# Patient Record
Sex: MALE | Race: WHITE | Employment: FULL TIME | URBAN - METROPOLITAN AREA
[De-identification: names, ages, dates, MRNs, and addresses within clinical notes are randomized per-mention and may not be internally consistent; named-entity substitution may affect disease eponyms.]

---

## 2023-10-24 ENCOUNTER — OFFICE VISIT (OUTPATIENT)
Dept: PRIMARY CARE CLINIC | Age: 23
End: 2023-10-24
Payer: COMMERCIAL

## 2023-10-24 VITALS
DIASTOLIC BLOOD PRESSURE: 69 MMHG | BODY MASS INDEX: 21.74 KG/M2 | TEMPERATURE: 99.2 F | WEIGHT: 164 LBS | HEART RATE: 84 BPM | HEIGHT: 73 IN | OXYGEN SATURATION: 97 % | SYSTOLIC BLOOD PRESSURE: 109 MMHG

## 2023-10-24 DIAGNOSIS — Q89.8 STICKLER SYNDROME: ICD-10-CM

## 2023-10-24 DIAGNOSIS — Z00.00 ROUTINE PHYSICAL EXAMINATION: Primary | ICD-10-CM

## 2023-10-24 DIAGNOSIS — Z83.2 FAMILY HISTORY OF FACTOR V LEIDEN MUTATION: ICD-10-CM

## 2023-10-24 PROCEDURE — G8484 FLU IMMUNIZE NO ADMIN: HCPCS | Performed by: FAMILY MEDICINE

## 2023-10-24 PROCEDURE — 99385 PREV VISIT NEW AGE 18-39: CPT | Performed by: FAMILY MEDICINE

## 2023-10-24 ASSESSMENT — PATIENT HEALTH QUESTIONNAIRE - PHQ9
SUM OF ALL RESPONSES TO PHQ QUESTIONS 1-9: 0
1. LITTLE INTEREST OR PLEASURE IN DOING THINGS: 0
SUM OF ALL RESPONSES TO PHQ QUESTIONS 1-9: 0
SUM OF ALL RESPONSES TO PHQ9 QUESTIONS 1 & 2: 0
2. FEELING DOWN, DEPRESSED OR HOPELESS: 0
SUM OF ALL RESPONSES TO PHQ QUESTIONS 1-9: 0
SUM OF ALL RESPONSES TO PHQ QUESTIONS 1-9: 0

## 2023-10-24 NOTE — PROGRESS NOTES
[address] [facility]. PRIMARY CARE  [address] \[facility]\"" [address]  Dept: [phone]  Dept Fax: [phone]     10/24/2023      Karson Fragoso   2000     Chief Complaint   Patient presents with    Establish Care       HPI    Pt comes in today for new patient visit to establish care and annual physical. No acute concerns. Has not had check up in many years. He is working on smoking cessation. Motivated to quit. Would like for STI panel. Works in [email or web address]. 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 Family h/o Factor V Leiden. Would like screening. No data recorded       Prior to Visit Medications    Not on File        Past Medical History:   Diagnosis Date    Stickler syndrome         Social History     Tobacco Use    Smoking status: Every Day     Types: Cigarettes    Smokeless tobacco: Never   Substance Use Topics    Alcohol use: Yes     Alcohol/week: 16.0 standard drinks of alcohol     Types: 16 Cans of beer per week    Drug use: Not Currently        Past Surgical History:   Procedure Laterality Date    CLEFT PALATE REPAIR      + hemangioma removal    DENTAL SURGERY      MANDIBLE SURGERY      MYRINGOTOMY AND TYMPANOSTOMY TUBE PLACEMENT      OTHER SURGICAL HISTORY      Left eyebrow laceration repair    RETINAL DETACHMENT SURGERY Left         Allergies   Allergen Reactions    Amoxicillin Rash        Family History   Problem Relation Age of Onset    No Known Problems Mother     Other Father         Factor V Leiden    Cancer Paternal Grandmother         Patient's past medical history, surgical history, family history, medications, and allergies  were all reviewed and updated as appropriate today. Review of Systems   Constitutional:  Negative for chills, fever and unexpected weight change. Respiratory:  Negative for cough and shortness of breath. Cardiovascular:  Negative for chest pain, palpitations and leg swelling.

## 2023-10-24 NOTE — PATIENT INSTRUCTIONS
[facility] Laboratory Locations - No appointment necessary. ? indicates the location is open Saturdays in addition to Monday through Friday. Call your preferred location for test preparation, business hours and other information you need. [employer] accepts [employer]. [facility]    ? [place] [id number] [facility]. [facility], [address]    Ph: [address] [place] [facility], [address]    Ph: [phone]   ? [address]    Thea Bence, [address] [facility]    Ph: [address], [address] [facility]    Ph: [phone] ? [place]   [address]   Ph: [phone]  ? [address] [facility], [address] [facility]    Ph: [place] [address] [facility], [address] [facility]   Ph: [phone]    NORTH    ? [place], [place] [address]    Ph: [phone]  [facility]   [address] [facility], [facility]   Ph: Akhil Carney, [address] [facility]: [phone], [facility]5 [facility]    Ph: [address] [facility].  [facility] [place], [place] [address]    Ph: [phone]

## 2023-10-31 PROBLEM — Z83.2 FAMILY HISTORY OF FACTOR V LEIDEN MUTATION: Status: ACTIVE | Noted: 2023-10-31

## 2023-10-31 PROBLEM — Q89.8 STICKLER SYNDROME: Status: ACTIVE | Noted: 2023-10-31

## 2023-10-31 ASSESSMENT — ENCOUNTER SYMPTOMS
NAUSEA: 0
SHORTNESS OF BREATH: 0
DIARRHEA: 0
ABDOMINAL PAIN: 0
COUGH: 0
VOMITING: 0

## 2024-04-10 ENCOUNTER — OFFICE VISIT (OUTPATIENT)
Dept: PRIMARY CARE CLINIC | Age: 24
End: 2024-04-10
Payer: COMMERCIAL

## 2024-04-10 ENCOUNTER — TELEPHONE (OUTPATIENT)
Dept: PRIMARY CARE CLINIC | Age: 24
End: 2024-04-10

## 2024-04-10 ENCOUNTER — HOSPITAL ENCOUNTER (OUTPATIENT)
Age: 24
End: 2024-04-10
Payer: COMMERCIAL

## 2024-04-10 ENCOUNTER — HOSPITAL ENCOUNTER (OUTPATIENT)
Dept: GENERAL RADIOLOGY | Age: 24
Discharge: HOME | End: 2024-04-10
Payer: COMMERCIAL

## 2024-04-10 VITALS
WEIGHT: 163.4 LBS | HEART RATE: 63 BPM | OXYGEN SATURATION: 97 % | BODY MASS INDEX: 21.54 KG/M2 | DIASTOLIC BLOOD PRESSURE: 75 MMHG | SYSTOLIC BLOOD PRESSURE: 115 MMHG

## 2024-04-10 DIAGNOSIS — S93.402A SPRAIN OF LEFT ANKLE, UNSPECIFIED LIGAMENT, INITIAL ENCOUNTER: Primary | ICD-10-CM

## 2024-04-10 DIAGNOSIS — S93.402A SPRAIN OF LEFT ANKLE, UNSPECIFIED LIGAMENT, INITIAL ENCOUNTER: ICD-10-CM

## 2024-04-10 PROCEDURE — 1036F TOBACCO NON-USER: CPT | Performed by: FAMILY MEDICINE

## 2024-04-10 PROCEDURE — 73610 X-RAY EXAM OF ANKLE: CPT

## 2024-04-10 PROCEDURE — G8428 CUR MEDS NOT DOCUMENT: HCPCS | Performed by: FAMILY MEDICINE

## 2024-04-10 PROCEDURE — G8420 CALC BMI NORM PARAMETERS: HCPCS | Performed by: FAMILY MEDICINE

## 2024-04-10 PROCEDURE — 99213 OFFICE O/P EST LOW 20 MIN: CPT | Performed by: FAMILY MEDICINE

## 2024-04-10 SDOH — ECONOMIC STABILITY: INCOME INSECURITY: HOW HARD IS IT FOR YOU TO PAY FOR THE VERY BASICS LIKE FOOD, HOUSING, MEDICAL CARE, AND HEATING?: NOT VERY HARD

## 2024-04-10 SDOH — ECONOMIC STABILITY: TRANSPORTATION INSECURITY
IN THE PAST 12 MONTHS, HAS LACK OF TRANSPORTATION KEPT YOU FROM MEETINGS, WORK, OR FROM GETTING THINGS NEEDED FOR DAILY LIVING?: NO

## 2024-04-10 SDOH — ECONOMIC STABILITY: FOOD INSECURITY: WITHIN THE PAST 12 MONTHS, THE FOOD YOU BOUGHT JUST DIDN'T LAST AND YOU DIDN'T HAVE MONEY TO GET MORE.: NEVER TRUE

## 2024-04-10 SDOH — ECONOMIC STABILITY: FOOD INSECURITY: WITHIN THE PAST 12 MONTHS, YOU WORRIED THAT YOUR FOOD WOULD RUN OUT BEFORE YOU GOT MONEY TO BUY MORE.: NEVER TRUE

## 2024-04-10 SDOH — ECONOMIC STABILITY: HOUSING INSECURITY
IN THE LAST 12 MONTHS, WAS THERE A TIME WHEN YOU DID NOT HAVE A STEADY PLACE TO SLEEP OR SLEPT IN A SHELTER (INCLUDING NOW)?: NO

## 2024-04-10 ASSESSMENT — PATIENT HEALTH QUESTIONNAIRE - PHQ9
SUM OF ALL RESPONSES TO PHQ9 QUESTIONS 1 & 2: 2
1. LITTLE INTEREST OR PLEASURE IN DOING THINGS: SEVERAL DAYS
SUM OF ALL RESPONSES TO PHQ QUESTIONS 1-9: 2
SUM OF ALL RESPONSES TO PHQ QUESTIONS 1-9: 2
2. FEELING DOWN, DEPRESSED OR HOPELESS: SEVERAL DAYS
SUM OF ALL RESPONSES TO PHQ9 QUESTIONS 1 & 2: 2
1. LITTLE INTEREST OR PLEASURE IN DOING THINGS: SEVERAL DAYS
SUM OF ALL RESPONSES TO PHQ QUESTIONS 1-9: 2
SUM OF ALL RESPONSES TO PHQ QUESTIONS 1-9: 2
2. FEELING DOWN, DEPRESSED OR HOPELESS: SEVERAL DAYS

## 2024-04-10 NOTE — TELEPHONE ENCOUNTER
Can you call patient to let him know xray shows possible small fracture. I would like for him to see Ortho for follow up. I have placed a referral. In the meantime, continue with the brace, ice and limited weight bearing.     Referral Details       Referred By   Referred To    Nazia Toney DO   [address] [facility]   [address]   [facility] [address]   Phone: [phone]   Fax: [phone]    Diagnoses: Sprain of left ankle, unspecified ligament, initial encounter   Order: [facility] [place]   Reason: Specialty Services Required    [facility]   [address] [facility]   [address]   [facility] [address]   Phone: [phone]   Fax: [phone]

## 2024-04-11 ENCOUNTER — OFFICE VISIT (OUTPATIENT)
Dept: ORTHOPEDIC SURGERY | Age: 24
End: 2024-04-11
Payer: COMMERCIAL

## 2024-04-11 VITALS — BODY MASS INDEX: 21.17 KG/M2 | HEIGHT: 74 IN | WEIGHT: 165 LBS

## 2024-04-11 DIAGNOSIS — S93.421A SPRAIN OF DELTOID LIGAMENT OF RIGHT ANKLE, INITIAL ENCOUNTER: ICD-10-CM

## 2024-04-11 DIAGNOSIS — M25.572 LEFT ANKLE PAIN, UNSPECIFIED CHRONICITY: ICD-10-CM

## 2024-04-11 DIAGNOSIS — M79.672 LEFT FOOT PAIN: Primary | ICD-10-CM

## 2024-04-11 PROCEDURE — 99202 OFFICE O/P NEW SF 15 MIN: CPT | Performed by: PHYSICIAN ASSISTANT

## 2024-04-11 PROCEDURE — G8427 DOCREV CUR MEDS BY ELIG CLIN: HCPCS | Performed by: PHYSICIAN ASSISTANT

## 2024-04-11 PROCEDURE — G8420 CALC BMI NORM PARAMETERS: HCPCS | Performed by: PHYSICIAN ASSISTANT

## 2024-04-11 PROCEDURE — L4361 PNEUMA/VAC WALK BOOT PRE OTS: HCPCS | Performed by: PHYSICIAN ASSISTANT

## 2024-04-11 PROCEDURE — 1036F TOBACCO NON-USER: CPT | Performed by: PHYSICIAN ASSISTANT

## 2024-04-11 SDOH — HEALTH STABILITY: PHYSICAL HEALTH: ON AVERAGE, HOW MANY MINUTES DO YOU ENGAGE IN EXERCISE AT THIS LEVEL?: 150+ MIN

## 2024-04-11 SDOH — HEALTH STABILITY: PHYSICAL HEALTH: ON AVERAGE, HOW MANY DAYS PER WEEK DO YOU ENGAGE IN MODERATE TO STRENUOUS EXERCISE (LIKE A BRISK WALK)?: 4 DAYS

## 2024-04-11 NOTE — PROGRESS NOTES
edema noted.     Intact skin without lacerations or abrasions, no significant erythema, rashes or skin lesions.      X Rays: were performed in the office today:   AP, lateral and oblique x-ray of the left ankle as well as AP standing foot and oblique x-ray of the left foot.  No acute displaced fractures noted.  There is widening of the medial ankle mortise.  Suggestive of a significant deltoid ligament injury.    Additional Tests reviewed: none  Additional Outside Records reviewed: none    Diagnosis:       ICD-10-CM    1. Left foot pain  M79.672 XR FOOT LEFT (MIN 3 VIEWS)      2. Left ankle pain, unspecified chronicity  M25.572 XR ANKLE LEFT (MIN 3 VIEWS)     Breg / Airselect Tall Walking Boot      3. Sprain of deltoid ligament of right ankle, initial encounter  S93.421A            Assessment and Plan:       Assessment:  Acute left ankle sprain, bimalleolar like.  The medial ankle mortise is widened.       I had an extensive discussion with Mr. Drew Edmonds regarding the natural history, etiology, and long term consequences of his condition. I have presented reasonable alternatives to the patient's proposed care, treatment, and services.  Risks and benefits of the treatment options also reviewed in detail. I have outlined a treatment plan with them. He has had full opportunity to ask his questions.  I have answered them all to his satisfaction.  I feel that Mr. Drew Edmonds understands our discussion today.       Plan:  Medications-   Rest, Ice, Compression and Elevation  Activity restriction/ Modification discussed.   OTC NSAIDS discussed.   The most common side effects from NSAIDs are stomachaches, heartburn, and nausea. NSAIDs may irritate the stomach lining. If the medicine upsets your stomach, you can try taking it with food. But if that doesn't help, talk with your doctor to make sure it's not a more serious problem, such as a stomach ulcer or bleeding in the stomach or intestines.  Using NSAIDs

## 2024-04-11 NOTE — PROGRESS NOTES
[facility]  [facility] PRIMARY CARE  [address] [place] [address]  Dept: [phone]  Dept Fax: [phone]     4/10/2024      Drew Edmonds   2000     Chief Complaint   Patient presents with    Ankle Injury       HPI    Pt comes in today for left ankle injury. Notes he fell while rock climbing over the weekend. Was ~ 3 feet off the ground. Inversion roll of the ankle. Thought he felt or heard a crack. Immediately had pain/swelling and difficulty with weight bearing. Delayed getting it looked at because he was camping for the eclipse. Has been using crutches and a brace. Icing over the last 24 hours has helped. + swelling and bruising. Can tolerate some weight if he is using the crutches but hard to walk.     PHQ-9 Total Score: 2 (4/10/2024  9:43 AM)       Prior to Visit Medications    Not on File        Past Medical History:   Diagnosis Date    ADHD (attention deficit hyperactivity disorder) 8457-0825    Stickler syndrome         Social History     Tobacco Use    Smoking status: Former     Current packs/day: 0.00     Average packs/day: 0.5 packs/day for 6.8 years (3.4 ttl pk-yrs)     Types: Cigarettes, E-Cigarettes     Start date: 2017     Quit date: 2023     Years since quittin.4    Smokeless tobacco: Never   Substance Use Topics    Alcohol use: Yes     Alcohol/week: 16.0 standard drinks of alcohol     Types: 16 Cans of beer per week    Drug use: Not Currently        Past Surgical History:   Procedure Laterality Date    CLEFT PALATE REPAIR      + hemangioma removal    COSMETIC SURGERY      DENTAL SURGERY      EYE SURGERY  2009    MANDIBLE SURGERY      MYRINGOTOMY AND TYMPANOSTOMY TUBE PLACEMENT      OTHER SURGICAL HISTORY      Left eyebrow laceration repair    RETINAL DETACHMENT SURGERY Left         Allergies   Allergen Reactions    Amoxicillin Rash        Family History   Problem Relation Age of Onset    No Known Problems Mother     Other Father

## 2024-04-12 ENCOUNTER — OFFICE VISIT (OUTPATIENT)
Dept: ORTHOPEDIC SURGERY | Age: 24
End: 2024-04-12
Payer: COMMERCIAL

## 2024-04-12 VITALS — WEIGHT: 165 LBS | BODY MASS INDEX: 21.17 KG/M2 | HEIGHT: 74 IN

## 2024-04-12 DIAGNOSIS — S93.492A SPRAIN OF ANTERIOR TALOFIBULAR LIGAMENT OF LEFT ANKLE, INITIAL ENCOUNTER: Primary | ICD-10-CM

## 2024-04-12 PROCEDURE — G8420 CALC BMI NORM PARAMETERS: HCPCS | Performed by: ORTHOPAEDIC SURGERY

## 2024-04-12 PROCEDURE — 1036F TOBACCO NON-USER: CPT | Performed by: ORTHOPAEDIC SURGERY

## 2024-04-12 PROCEDURE — G8427 DOCREV CUR MEDS BY ELIG CLIN: HCPCS | Performed by: ORTHOPAEDIC SURGERY

## 2024-04-12 PROCEDURE — 99214 OFFICE O/P EST MOD 30 MIN: CPT | Performed by: ORTHOPAEDIC SURGERY

## 2024-04-12 RX ORDER — NAPROXEN 500 MG/1
500 TABLET ORAL 2 TIMES DAILY WITH MEALS
Qty: 60 TABLET | Refills: 0 | Status: SHIPPED | OUTPATIENT
Start: 2024-04-12 | End: 2024-05-12

## 2024-04-12 SDOH — HEALTH STABILITY: PHYSICAL HEALTH: ON AVERAGE, HOW MANY MINUTES DO YOU ENGAGE IN EXERCISE AT THIS LEVEL?: 150+ MIN

## 2024-04-12 SDOH — HEALTH STABILITY: PHYSICAL HEALTH: ON AVERAGE, HOW MANY DAYS PER WEEK DO YOU ENGAGE IN MODERATE TO STRENUOUS EXERCISE (LIKE A BRISK WALK)?: 4 DAYS

## 2024-04-12 NOTE — PROGRESS NOTES
CHIEF COMPLAINT: Left ankle pain/ Ankle ATF sprain.    DATE OF INJURY: 2024    HISTORY:  Mr. Edmonds 23 y.o.  male presents today for the first visit for evaluation of a left ankle injury which occurred when he was rock climbing and fell. He is complaining of lateral and medial ankle pain. He was seen By Deepti on 2024 because of the pain.  He reports today moderate pain, 6/10. Pain increase with walking and decrease with rest and elevation. No numbness or tingling sensation, and no other complaint.      Past Medical History:   Diagnosis Date    ADHD (attention deficit hyperactivity disorder) 0407-1269    Stickler syndrome         Past Surgical History:   Procedure Laterality Date    CLEFT PALATE REPAIR      + hemangioma removal    COSMETIC SURGERY      DENTAL SURGERY      EYE SURGERY      MANDIBLE SURGERY      MYRINGOTOMY AND TYMPANOSTOMY TUBE PLACEMENT      OTHER SURGICAL HISTORY      Left eyebrow laceration repair    RETINAL DETACHMENT SURGERY Left         Social History     Socioeconomic History    Marital status: Single     Spouse name: Not on file    Number of children: Not on file    Years of education: Not on file    Highest education level: Not on file   Occupational History    Not on file   Tobacco Use    Smoking status: Former     Current packs/day: 0.00     Average packs/day: 0.5 packs/day for 6.8 years (3.4 ttl pk-yrs)     Types: Cigarettes, E-Cigarettes     Start date: 2017     Quit date: 2023     Years since quittin.4    Smokeless tobacco: Never   Substance and Sexual Activity    Alcohol use: Yes     Alcohol/week: 16.0 standard drinks of alcohol     Types: 16 Cans of beer per week    Drug use: Not Currently    Sexual activity: Yes     Partners: Female   Other Topics Concern    Not on file   Social History Narrative    Not on file     Social Determinants of Health     Financial Resource Strain: Low Risk  (4/10/2024)    Overall Financial Resource Strain ([facility])

## 2024-04-24 ENCOUNTER — OFFICE VISIT (OUTPATIENT)
Dept: ORTHOPEDIC SURGERY | Age: 24
End: 2024-04-24
Payer: COMMERCIAL

## 2024-04-24 DIAGNOSIS — S93.492A SPRAIN OF ANTERIOR TALOFIBULAR LIGAMENT OF LEFT ANKLE, INITIAL ENCOUNTER: Primary | ICD-10-CM

## 2024-04-24 PROCEDURE — 99213 OFFICE O/P EST LOW 20 MIN: CPT | Performed by: ORTHOPAEDIC SURGERY

## 2024-04-24 PROCEDURE — G8428 CUR MEDS NOT DOCUMENT: HCPCS | Performed by: ORTHOPAEDIC SURGERY

## 2024-04-24 PROCEDURE — G8420 CALC BMI NORM PARAMETERS: HCPCS | Performed by: ORTHOPAEDIC SURGERY

## 2024-04-24 PROCEDURE — 1036F TOBACCO NON-USER: CPT | Performed by: ORTHOPAEDIC SURGERY

## 2024-04-24 NOTE — PROGRESS NOTES
CHIEF COMPLAINT: Left ankle pain/ Ankle ATF sprain.    DATE OF INJURY: 2024    HISTORY:  Mr. Edmonds 23 y.o.  male presents today for follow up visit for evaluation of a left ankle injury which occurred when he was rock climbing and fell. He is complaining of lateral and medial ankle pain that is improving. He was seen By Deepti on 2024 because of the pain.  He reports today mild pain, 4/10. Pain increase with walking and decrease with rest and elevation. He has been in a boot NWB. No numbness or tingling sensation, and no other complaint.      Past Medical History:   Diagnosis Date    ADHD (attention deficit hyperactivity disorder) 0333-3396    Stickler syndrome         Past Surgical History:   Procedure Laterality Date    CLEFT PALATE REPAIR      + hemangioma removal    COSMETIC SURGERY      DENTAL SURGERY      EYE SURGERY      MANDIBLE SURGERY      MYRINGOTOMY AND TYMPANOSTOMY TUBE PLACEMENT      OTHER SURGICAL HISTORY      Left eyebrow laceration repair    RETINAL DETACHMENT SURGERY Left         Social History     Socioeconomic History    Marital status: Single     Spouse name: Not on file    Number of children: Not on file    Years of education: Not on file    Highest education level: Not on file   Occupational History    Not on file   Tobacco Use    Smoking status: Former     Current packs/day: 0.00     Average packs/day: 0.5 packs/day for 6.8 years (3.4 ttl pk-yrs)     Types: Cigarettes, E-Cigarettes     Start date: 2017     Quit date: 2023     Years since quittin.4    Smokeless tobacco: Never   Substance and Sexual Activity    Alcohol use: Yes     Alcohol/week: 16.0 standard drinks of alcohol     Types: 16 Cans of beer per week    Drug use: Not Currently    Sexual activity: Yes     Partners: Female   Other Topics Concern    Not on file   Social History Narrative    Not on file     Social Determinants of Health     Financial Resource Strain: Low Risk  (4/10/2024)

## 2024-05-08 ENCOUNTER — OFFICE VISIT (OUTPATIENT)
Dept: ORTHOPEDIC SURGERY | Age: 24
End: 2024-05-08
Payer: COMMERCIAL

## 2024-05-08 VITALS — BODY MASS INDEX: 21.17 KG/M2 | WEIGHT: 165 LBS | HEIGHT: 74 IN

## 2024-05-08 DIAGNOSIS — S93.492A SPRAIN OF ANTERIOR TALOFIBULAR LIGAMENT OF LEFT ANKLE, INITIAL ENCOUNTER: Primary | ICD-10-CM

## 2024-05-08 DIAGNOSIS — S93.421A SPRAIN OF DELTOID LIGAMENT OF RIGHT ANKLE, INITIAL ENCOUNTER: ICD-10-CM

## 2024-05-08 PROCEDURE — G8427 DOCREV CUR MEDS BY ELIG CLIN: HCPCS | Performed by: ORTHOPAEDIC SURGERY

## 2024-05-08 PROCEDURE — G8420 CALC BMI NORM PARAMETERS: HCPCS | Performed by: ORTHOPAEDIC SURGERY

## 2024-05-08 PROCEDURE — 1036F TOBACCO NON-USER: CPT | Performed by: ORTHOPAEDIC SURGERY

## 2024-05-08 PROCEDURE — 99213 OFFICE O/P EST LOW 20 MIN: CPT | Performed by: ORTHOPAEDIC SURGERY

## 2024-05-08 NOTE — PROGRESS NOTES
CHIEF COMPLAINT: Left ankle pain/ Ankle ATF sprain.    DATE OF INJURY: 2024    HISTORY:  Mr. Edmonds 23 y.o.  male presents today for follow up visit for evaluation of a left ankle injury which occurred when he was rock climbing and fell. He has been WB in a boot. He reports improving lateral and medial ankle pain, still feels stiff. He was seen By Deepti on 2024 because of the pain.  He reports today mild pain, 0-1/10. Pain increase with walking and decrease with rest and elevation.  No numbness or tingling sensation, and no other complaint.  He is an avid rock climber.    Past Medical History:   Diagnosis Date    ADHD (attention deficit hyperactivity disorder) 7530-3524    Stickler syndrome         Past Surgical History:   Procedure Laterality Date    CLEFT PALATE REPAIR      + hemangioma removal    COSMETIC SURGERY      DENTAL SURGERY      EYE SURGERY      MANDIBLE SURGERY      MYRINGOTOMY AND TYMPANOSTOMY TUBE PLACEMENT      OTHER SURGICAL HISTORY      Left eyebrow laceration repair    RETINAL DETACHMENT SURGERY Left         Social History     Socioeconomic History    Marital status: Single     Spouse name: Not on file    Number of children: Not on file    Years of education: Not on file    Highest education level: Not on file   Occupational History    Not on file   Tobacco Use    Smoking status: Former     Current packs/day: 0.00     Average packs/day: 0.5 packs/day for 6.8 years (3.4 ttl pk-yrs)     Types: Cigarettes, E-Cigarettes     Start date: 2017     Quit date: 2023     Years since quittin.5    Smokeless tobacco: Never   Substance and Sexual Activity    Alcohol use: Yes     Alcohol/week: 16.0 standard drinks of alcohol     Types: 16 Cans of beer per week    Drug use: Not Currently    Sexual activity: Yes     Partners: Female   Other Topics Concern    Not on file   Social History Narrative    Not on file     Social Determinants of Health     Financial Resource Strain:

## 2024-05-10 ENCOUNTER — HOSPITAL ENCOUNTER (OUTPATIENT)
Dept: PHYSICAL THERAPY | Age: 24
Setting detail: THERAPIES SERIES
Discharge: HOME | End: 2024-05-10
Attending: ORTHOPAEDIC SURGERY
Payer: COMMERCIAL

## 2024-05-10 DIAGNOSIS — M25.572 ACUTE LEFT ANKLE PAIN: Primary | ICD-10-CM

## 2024-05-10 PROCEDURE — 97161 PT EVAL LOW COMPLEX 20 MIN: CPT

## 2024-05-10 PROCEDURE — 97110 THERAPEUTIC EXERCISES: CPT

## 2024-05-10 PROCEDURE — 97112 NEUROMUSCULAR REEDUCATION: CPT

## 2024-05-10 PROCEDURE — 97016 VASOPNEUMATIC DEVICE THERAPY: CPT

## 2024-05-10 NOTE — PLAN OF CARE
adjustment due to lack of progress  [] Patient is not progressing as expected and requires additional follow up with physician  [] Other:     TREATMENT PLAN     Frequency/Duration: 1-2x/week for 4-6 weeks for the following treatment interventions:    Interventions:  Therapeutic Exercise (08997) including: strength training, ROM, and functional mobility  Therapeutic Activities (06612) including: functional mobility training and education.  Neuromuscular Re-education (92235) activation and proprioception, including postural re-education.    Gait Training (52062) for normalization of ambulation patterns and AD training.   Manual Therapy (52957) as indicated to include: Passive Range of Motion, Soft Tissue Mobilization, and Dry Needling/IASTM  Modalities as needed that may include: Cryotherapy, Electrical Stimulation, Biofeedback, and Vasoneumatic Compression  Patient education on joint protection, postural re-education, activity modification, and progression of HEP    Plan: POC initiated as per evaluation    Electronically Signed by Radha Álvarez PT  Date: 05/10/2024     Note: Portions of this note have been templated and/or copied from initial evaluation, reassessments and prior notes for documentation efficiency.    Note: If patient does not return for scheduled/recommended follow up visits, this note will serve as a discharge from care along with the most recent update on progress.

## 2024-05-13 ENCOUNTER — HOSPITAL ENCOUNTER (OUTPATIENT)
Dept: PHYSICAL THERAPY | Age: 24
Setting detail: THERAPIES SERIES
Discharge: HOME | End: 2024-05-13
Attending: ORTHOPAEDIC SURGERY
Payer: COMMERCIAL

## 2024-05-13 PROCEDURE — 97016 VASOPNEUMATIC DEVICE THERAPY: CPT

## 2024-05-13 PROCEDURE — 97112 NEUROMUSCULAR REEDUCATION: CPT

## 2024-05-13 PROCEDURE — 97110 THERAPEUTIC EXERCISES: CPT

## 2024-05-13 NOTE — FLOWSHEET NOTE
[facility]- Outpatient Rehabilitation and Therapy [address] [place] [address] office: [phone] fax: [phone]           Physical Therapy: TREATMENT/PROGRESS NOTE   Patient: Drew Edmonds (23 y.o. male)   Examination Date: 2024   :  2000 MRN: [id number]   Visit #: 2   Insurance Allowable Auth Needed   [place] []Yes    []No    Insurance: Payor: UNITED HE[employer]CARE / Plan: UNITED HE[employer]CARE / Product Type: *No Product type* /   Insurance ID: [id number] - (Commercial)  Secondary Insurance (if applicable):    Treatment Diagnosis:     ICD-10-CM    1. Acute left ankle pain  M25.572          Medical Diagnosis:  Sprain of anterior talofibular ligament of left ankle, initial encounter [S93.492A]  Sprain of deltoid ligament of right ankle, initial encounter [S93.421A]   Referring Physician: Deepthi Moody MD  PCP: Nazia Toney DO     Plan of care signed (Y/N):     Date of Patient follow up with Physician:      Progress Report/POC: EVAL today  POC update due: (10 visits /OR AUTH LIMITS, whichever is less)  2024                                             Precautions/ Contra-indications:           Latex allergy:  NO  Pacemaker:    NO  Contraindications for Manipulation: None  Date of Surgery:   Other:    Red Flags:  None    C-SSRS Triggered by Intake questionnaire:   Patient answered 'NO' to both behavioral questions on intake.  No further screening warranted    Preferred Language for Healthcare:   [x] English       [] other:    SUBJECTIVE EXAMINATION     Today: pt notes that he got a lace up brace to wear and does feel like he can walk around longer with it on but he does feel some pressure pain along lateral ankle when he is wearing the brace and taller boots.          Test used Initial score  5/10/24 2024   Pain Summary VAS 6-7/10    Functional questionnaire LEFS 60%    Other:                  OBJECTIVE EXAMINATION     5/10/24  Joint mobility:

## 2024-05-17 ENCOUNTER — HOSPITAL ENCOUNTER (OUTPATIENT)
Dept: PHYSICAL THERAPY | Age: 24
Setting detail: THERAPIES SERIES
Discharge: HOME | End: 2024-05-17
Attending: ORTHOPAEDIC SURGERY
Payer: COMMERCIAL

## 2024-05-17 PROCEDURE — 97016 VASOPNEUMATIC DEVICE THERAPY: CPT

## 2024-05-17 PROCEDURE — 97110 THERAPEUTIC EXERCISES: CPT

## 2024-05-17 PROCEDURE — 97112 NEUROMUSCULAR REEDUCATION: CPT

## 2024-05-17 NOTE — FLOWSHEET NOTE
[facility]- Outpatient Rehabilitation and Therapy [address] [place] [address] office: [phone] fax: [phone]           Physical Therapy: TREATMENT/PROGRESS NOTE   Patient: Drew Edmonds (23 y.o. male)   Examination Date: 2024   :  2000 MRN: [id number]   Visit #: 3   Insurance Allowable Auth Needed   [place] []Yes    []No    Insurance: Payor: UNITED HEALTHCARE / Plan: UNITED HEALTHCARE / Product Type: *No Product type* /   Insurance ID: [id number] - (Commercial)  Secondary Insurance (if applicable):    Treatment Diagnosis:     ICD-10-CM    1. Acute left ankle pain  M25.572          Medical Diagnosis:  Sprain of anterior talofibular ligament of left ankle, initial encounter [S93.492A]  Sprain of deltoid ligament of right ankle, initial encounter [S93.421A]   Referring Physician: Deepthi Moody MD  PCP: Nazia Toney DO     Plan of care signed (Y/N):     Date of Patient follow up with Physician:      Progress Report/POC: NO  POC update due: (10 visits /OR AUTH LIMITS, whichever is less)  2024                                             Precautions/ Contra-indications:           Latex allergy:  NO  Pacemaker:    NO  Contraindications for Manipulation: None  Date of Surgery:   Other:    Red Flags:  None    C-SSRS Triggered by Intake questionnaire:   Patient answered 'NO' to both behavioral questions on intake.  No further screening warranted    Preferred Language for Healthcare:   [x] English       [] other:    SUBJECTIVE EXAMINATION     Today: pt notes he is feeling better with walking and standing after exercises. He did have some pain in ankle after trying some lower body lifting on Wednesday.          Test used Initial score  5/10/24 2024   Pain Summary VAS 6-7/10 2/10   Functional questionnaire LEFS 60%    Other:                  OBJECTIVE EXAMINATION     5/10/24  Joint mobility:    []Normal    [x]Hypo   []Hyper    Palpation:  TTP ATFL     Functional

## 2024-05-22 ENCOUNTER — HOSPITAL ENCOUNTER (OUTPATIENT)
Dept: PHYSICAL THERAPY | Age: 24
Setting detail: THERAPIES SERIES
Discharge: HOME | End: 2024-05-22
Attending: ORTHOPAEDIC SURGERY
Payer: COMMERCIAL

## 2024-05-22 PROCEDURE — 97016 VASOPNEUMATIC DEVICE THERAPY: CPT

## 2024-05-22 PROCEDURE — 97112 NEUROMUSCULAR REEDUCATION: CPT

## 2024-05-22 PROCEDURE — 97530 THERAPEUTIC ACTIVITIES: CPT

## 2024-05-22 PROCEDURE — 97110 THERAPEUTIC EXERCISES: CPT

## 2024-05-22 NOTE — FLOWSHEET NOTE
(Game Ready): Applied to Ankle Left for significant edema, swelling, pain control for 15 minutes.  Relevant ICD-10 R22.4 Localized swelling of lower limb.   Girth Left Right Post Vaso   Knee: Midpatella 54.5  54cm   Knee: 5 cm above      Knee: 15 cm above      Ankle: transmalleolar      Ankle: figure 8              Education/Home Exercise Program:   Access Code: [email or web address]  URL: [email or web address]  Date: 05/13/2024  Prepared by: Radha Álvarez    Exercises  - Long Sitting Calf Stretch with Strap  - 1 x daily - 7 x weekly - 1 sets - 3 reps - 30 hold  - Long Sitting Soleus Stretch on Bolster with Strap  - 1 x daily - 7 x weekly - 1 sets - 3 reps - 30 hold  - Seated Ankle Alphabet  - 1 x daily - 7 x weekly - 2 sets  - Long Sitting Ankle Plantar Flexion with Resistance  - 1 x daily - 7 x weekly - 3 sets - 10 reps  - Long Sitting Ankle Inversion with Resistance  - 1 x daily - 7 x weekly - 3 sets - 10 reps  - Long Sitting Ankle Eversion with Resistance  - 1 x daily - 7 x weekly - 3 sets - 10 reps  - Single Leg Stance with Support  - 1 x daily - 7 x weekly - 1 sets - 5 reps - 15-20 hold      ASSESSMENT   Assessment:   Drew Edmonds is a 23 y.o. male presenting today to Outpatient PT with signs and symptoms consistent with L ankle sprain .    Pt. presents with the functional impairments and activity limitations listed below and would benefit from Outpatient PT to address the below impairments as well as improve pain, and restore function.     Today's Assessment:  pt able to progress ck activities and balance activities without increase in pain. He does note that step up and overs are challenging to control motion on step down. Pt will continue to require skilled intervention in order to improve ck/wb tolerance, balance/proprioception to return to PLOF.      Medical Necessity Documentation:  I certify that this patient meets the below criteria necessary for medical necessity for care and/or justification of

## 2024-05-24 ENCOUNTER — HOSPITAL ENCOUNTER (OUTPATIENT)
Dept: PHYSICAL THERAPY | Age: 24
Setting detail: THERAPIES SERIES
Discharge: HOME | End: 2024-05-24
Attending: ORTHOPAEDIC SURGERY
Payer: COMMERCIAL

## 2024-05-24 PROCEDURE — 97112 NEUROMUSCULAR REEDUCATION: CPT

## 2024-05-24 PROCEDURE — 97530 THERAPEUTIC ACTIVITIES: CPT

## 2024-05-24 PROCEDURE — 97110 THERAPEUTIC EXERCISES: CPT

## 2024-05-24 NOTE — FLOWSHEET NOTE
Compression (Game Ready): Applied to Ankle Left for significant edema, swelling, pain control for 15 minutes.  Relevant ICD-10 R22.4 Localized swelling of lower limb.   Girth Left Right Post Vaso   Knee: Midpatella 54.5  54cm   Knee: 5 cm above      Knee: 15 cm above      Ankle: transmalleolar      Ankle: figure 8              Education/Home Exercise Program:   Access Code: [email or web address]  URL: [email or web address]  Date: 05/13/2024  Prepared by: Radha Álvarez    Exercises  - Long Sitting Calf Stretch with Strap  - 1 x daily - 7 x weekly - 1 sets - 3 reps - 30 hold  - Long Sitting Soleus Stretch on Bolster with Strap  - 1 x daily - 7 x weekly - 1 sets - 3 reps - 30 hold  - Seated Ankle Alphabet  - 1 x daily - 7 x weekly - 2 sets  - Long Sitting Ankle Plantar Flexion with Resistance  - 1 x daily - 7 x weekly - 3 sets - 10 reps  - Long Sitting Ankle Inversion with Resistance  - 1 x daily - 7 x weekly - 3 sets - 10 reps  - Long Sitting Ankle Eversion with Resistance  - 1 x daily - 7 x weekly - 3 sets - 10 reps  - Single Leg Stance with Support  - 1 x daily - 7 x weekly - 1 sets - 5 reps - 15-20 hold      ASSESSMENT   Assessment:   Drew Edmonds is a 23 y.o. male presenting today to Outpatient PT with signs and symptoms consistent with L ankle sprain .    Pt. presents with the functional impairments and activity limitations listed below and would benefit from Outpatient PT to address the below impairments as well as improve pain, and restore function.     Today's Assessment:  pt able to progress ck activities and balance activities. He notes that bosu steps and SL DL are challenging but do not cause pain.  Pt will continue to require skilled intervention in order to improve ck/wb tolerance, balance/proprioception to return to PLOF.      Medical Necessity Documentation:  I certify that this patient meets the below criteria necessary for medical necessity for care and/or justification of therapy services:  The

## 2024-05-28 ENCOUNTER — HOSPITAL ENCOUNTER (OUTPATIENT)
Dept: PHYSICAL THERAPY | Age: 24
Setting detail: THERAPIES SERIES
Discharge: HOME | End: 2024-05-28
Attending: ORTHOPAEDIC SURGERY
Payer: COMMERCIAL

## 2024-05-28 PROCEDURE — 97110 THERAPEUTIC EXERCISES: CPT

## 2024-05-28 PROCEDURE — 97530 THERAPEUTIC ACTIVITIES: CPT

## 2024-05-28 PROCEDURE — 97112 NEUROMUSCULAR REEDUCATION: CPT

## 2024-05-28 NOTE — FLOWSHEET NOTE
[facility]- Outpatient Rehabilitation and Therapy [address] [place] [address] office: [phone] fax: [phone]           Physical Therapy: TREATMENT/PROGRESS NOTE   Patient: Drew Edmonds (23 y.o. male)   Examination Date: 2024   :  2000 MRN: [id number]   Visit #: 6   Insurance Allowable Auth Needed   MN []Yes    []No    Insurance: Payor: [employer] / Plan: [employer] / Product Type: *No Product type* /   Insurance ID: [id number] - (Commercial)  Secondary Insurance (if applicable):    Treatment Diagnosis:     ICD-10-CM    1. Acute left ankle pain  M25.572          Medical Diagnosis:  Sprain of anterior talofibular ligament of left ankle, initial encounter [S93.492A]  Sprain of deltoid ligament of right ankle, initial encounter [S93.421A]   Referring Physician: Deepthi Moody MD  PCP: Nazia Toney DO     Plan of care signed (Y/N):     Date of Patient follow up with Physician:      Progress Report/POC: NO  POC update due: (10 visits /OR AUTH LIMITS, whichever is less)  2024                                             Precautions/ Contra-indications:           Latex allergy:  NO  Pacemaker:    NO  Contraindications for Manipulation: None  Date of Surgery:   Other:    Red Flags:  None    C-SSRS Triggered by Intake questionnaire:   Patient answered 'NO' to both behavioral questions on intake.  No further screening warranted    Preferred Language for Healthcare:   [x] English       [] other:    SUBJECTIVE EXAMINATION     Today:  pt notes he is doing alright, does still feel some popping and clicking in ankle at times but it is becoming less painful.          Test used Initial score  5/10/24 2024   Pain Summary VAS 6-7/10 1-2/10   Functional questionnaire LEFS 60%    Other:                  OBJECTIVE EXAMINATION     5/10/24  Joint mobility:    []Normal    [x]Hypo   []Hyper    Palpation:  TTP ATFL     Functional Mobility/Transfers:     Posture:

## 2024-05-30 ENCOUNTER — HOSPITAL ENCOUNTER (OUTPATIENT)
Dept: PHYSICAL THERAPY | Age: 24
Setting detail: THERAPIES SERIES
Discharge: HOME | End: 2024-05-30
Attending: ORTHOPAEDIC SURGERY
Payer: COMMERCIAL

## 2024-05-30 PROCEDURE — 97110 THERAPEUTIC EXERCISES: CPT

## 2024-05-30 PROCEDURE — 97112 NEUROMUSCULAR REEDUCATION: CPT

## 2024-05-30 PROCEDURE — 97530 THERAPEUTIC ACTIVITIES: CPT

## 2024-05-30 NOTE — FLOWSHEET NOTE
[facility]- Outpatient Rehabilitation and Therapy [address] [place] [address] office: [phone] fax: [phone]           Physical Therapy: TREATMENT/PROGRESS NOTE   Patient: Drew Edmonds (23 y.o. male)   Examination Date: 2024   :  2000 MRN: [id number]   Visit #: 7   Insurance Allowable Auth Needed   MN []Yes    []No    Insurance: Payor: [employer] / Plan: [employer] HE[employer] / Product Type: *No Product type* /   Insurance ID: [id number] - (Commercial)  Secondary Insurance (if applicable):    Treatment Diagnosis:     ICD-10-CM    1. Acute left ankle pain  M25.572          Medical Diagnosis:  Sprain of anterior talofibular ligament of left ankle, initial encounter [S93.492A]  Sprain of deltoid ligament of right ankle, initial encounter [S93.421A]   Referring Physician: Deepthi Moody MD  PCP: Nazia Toney DO     Plan of care signed (Y/N):     Date of Patient follow up with Physician:      Progress Report/POC: NO  POC update due: (10 visits /OR AUTH LIMITS, whichever is less)  2024                                             Precautions/ Contra-indications:           Latex allergy:  NO  Pacemaker:    NO  Contraindications for Manipulation: None  Date of Surgery:   Other:    Red Flags:  None    C-SSRS Triggered by Intake questionnaire:   Patient answered 'NO' to both behavioral questions on intake.  No further screening warranted    Preferred Language for Healthcare:   [x] English       [] other:    SUBJECTIVE EXAMINATION     Today:  pt notes he did a lower body workout Tuesday and felt some soreness in ankle afterwards but otherwise was pleased he was able to do so.           Test used Initial score  5/10/24 2024   Pain Summary VAS 6-7/10 1-210   Functional questionnaire LEFS 60%    Other:                  OBJECTIVE EXAMINATION     5/10/24  Joint mobility:    []Normal    [x]Hypo   []Hyper    Palpation:  TTP ATFL     Functional Mobility/Transfers:

## 2024-06-05 ENCOUNTER — HOSPITAL ENCOUNTER (OUTPATIENT)
Dept: PHYSICAL THERAPY | Age: 24
Setting detail: THERAPIES SERIES
Discharge: HOME | End: 2024-06-05
Attending: ORTHOPAEDIC SURGERY
Payer: COMMERCIAL

## 2024-06-05 PROCEDURE — 97530 THERAPEUTIC ACTIVITIES: CPT

## 2024-06-05 PROCEDURE — 97110 THERAPEUTIC EXERCISES: CPT

## 2024-06-05 PROCEDURE — 97112 NEUROMUSCULAR REEDUCATION: CPT

## 2024-06-05 NOTE — FLOWSHEET NOTE
[facility]- Outpatient Rehabilitation and Therapy [address] [place] [address] office: [phone] fax: [phone]           Physical Therapy: TREATMENT/PROGRESS NOTE   Patient: Drew Edmonds (23 y.o. male)   Examination Date: 2024   :  2000 MRN: [id number]   Visit #: 8   Insurance Allowable Auth Needed   MN []Yes    []No    Insurance: Payor: [employer] / Plan: [employer] HE[employer]CARE / Product Type: *No Product type* /   Insurance ID: [id number] - (Commercial)  Secondary Insurance (if applicable):    Treatment Diagnosis:     ICD-10-CM    1. Acute left ankle pain  M25.572          Medical Diagnosis:  Sprain of anterior talofibular ligament of left ankle, initial encounter [S93.492A]  Sprain of deltoid ligament of right ankle, initial encounter [S93.421A]   Referring Physician: Deepthi Moody MD  PCP: Nazia Toney DO     Plan of care signed (Y/N):     Date of Patient follow up with Physician:      Progress Report/POC: NO  POC update due: (10 visits /OR AUTH LIMITS, whichever is less)  2024                                             Precautions/ Contra-indications:           Latex allergy:  NO  Pacemaker:    NO  Contraindications for Manipulation: None  Date of Surgery:   Other:    Red Flags:  None    C-SSRS Triggered by Intake questionnaire:   Patient answered 'NO' to both behavioral questions on intake.  No further screening warranted    Preferred Language for Healthcare:   [x] English       [] other:    SUBJECTIVE EXAMINATION     Today: pt notes he has been doing well but did start to notice some lateral ankle discomfort following a 2 mile walk with his dog. He has been able to return to climbing without issues however.          Test used Initial score  5/10/24 2024   Pain Summary VAS 6-7/10 1-2/10   Functional questionnaire LEFS 60%    Other:                  OBJECTIVE EXAMINATION     5/10/24  Joint mobility:

## 2024-06-07 ENCOUNTER — TELEPHONE (OUTPATIENT)
Dept: ORTHOPEDIC SURGERY | Age: 24
End: 2024-06-07

## 2024-06-07 ENCOUNTER — OFFICE VISIT (OUTPATIENT)
Dept: ORTHOPEDIC SURGERY | Age: 24
End: 2024-06-07

## 2024-06-07 DIAGNOSIS — S93.492A SPRAIN OF ANTERIOR TALOFIBULAR LIGAMENT OF LEFT ANKLE, INITIAL ENCOUNTER: Primary | ICD-10-CM

## 2024-06-07 NOTE — TELEPHONE ENCOUNTER
SW regarding imaging MRI Left ankle approval and authorization being valid.  Patient was instructed that their imaging needs to be scheduled at [facility]. The patient was instructed to contact the facility to schedule  at:     [facility] [address] [place] [address] P: [phone]     A follow up appointment will need to be scheduled to review the results and treatment plan.

## 2024-06-07 NOTE — PROGRESS NOTES
CHIEF COMPLAINT: Left ankle pain/ Ankle ATF sprain.    DATE OF INJURY: 2024    HISTORY:  Mr. Edmonds 23 y.o.  male presents today for follow up visit for evaluation of a left ankle injury which occurred when he was rock climbing and fell. He has been WB and is working with PT and reports improvement in the swelling, but still has pain. He reports improving lateral and medial ankle pain, still feels stiff. He was seen By Deepti on 2024 because of the pain.  He reports today mild pain, 0-3/10. Pain increase with walking and decrease with rest and elevation.  No numbness or tingling sensation, and no other complaint.  He is an avid rock climber.    Past Medical History:   Diagnosis Date    ADHD (attention deficit hyperactivity disorder) 0413-2677    Stickler syndrome         Past Surgical History:   Procedure Laterality Date    CLEFT PALATE REPAIR      + hemangioma removal    COSMETIC SURGERY      DENTAL SURGERY      EYE SURGERY      MANDIBLE SURGERY      MYRINGOTOMY AND TYMPANOSTOMY TUBE PLACEMENT      OTHER SURGICAL HISTORY      Left eyebrow laceration repair    RETINAL DETACHMENT SURGERY Left         Social History     Socioeconomic History    Marital status: Single     Spouse name: Not on file    Number of children: Not on file    Years of education: Not on file    Highest education level: Not on file   Occupational History    Not on file   Tobacco Use    Smoking status: Former     Current packs/day: 0.00     Average packs/day: 0.5 packs/day for 6.8 years (3.4 ttl pk-yrs)     Types: Cigarettes, E-Cigarettes     Start date: 2017     Quit date: 2023     Years since quittin.6    Smokeless tobacco: Never   Substance and Sexual Activity    Alcohol use: Yes     Alcohol/week: 16.0 standard drinks of alcohol     Types: 16 Cans of beer per week    Drug use: Not Currently    Sexual activity: Yes     Partners: Female   Other Topics Concern    Not on file   Social History Narrative    Not

## 2024-06-12 ENCOUNTER — HOSPITAL ENCOUNTER (OUTPATIENT)
Dept: PHYSICAL THERAPY | Age: 24
Setting detail: THERAPIES SERIES
Discharge: HOME | End: 2024-06-12
Attending: ORTHOPAEDIC SURGERY
Payer: COMMERCIAL

## 2024-06-12 PROCEDURE — 97530 THERAPEUTIC ACTIVITIES: CPT

## 2024-06-12 PROCEDURE — 97112 NEUROMUSCULAR REEDUCATION: CPT

## 2024-06-12 NOTE — FLOWSHEET NOTE
impairments and/or activity limitations and would benefit from continued outpatient therapy services to address the deficits outlined in the patients goals    Return to Play: NA    Prognosis for POC: [x] Good [] Fair  [] Poor    Patient requires continued skilled intervention: [x] Yes  [] No      CHARGE CAPTURE     PT CHARGE GRID   CPT Code (TIMED) minutes # CPT Code (UNTIMED) #     Therex (36567)     EVAL:LOW (49467 - Typically 20 minutes face-to-face)     Neuromusc. Re-ed (35431) 25 2  Re-Eval (99281)     Manual (75505)    Estim Unattended (07907)     Ther. Act (66853) 14 1  Mech. Traction (66935)     Gait (24094)    Dry Needle 1-2 muscle (20560)     Aquatic Therex (99832)    Dry Needle 3+ muscle (20561)     Iontophoresis (85201)    VASO (97403)     Ultrasound (71230)    Group Therapy (08658)     Estim Attended (37686)    Canalith Repositioning (09824)     Other:    Other:    Total Timed Code Tx Minutes 39 3       Total Treatment Minutes 50        Charge Justification:  (64870) THERAPEUTIC EXERCISE - Provided verbal/tactile cueing for activities related to strengthening, flexibility, endurance, ROM performed to prevent loss of range of motion, maintain or improve muscular strength or increase flexibility, following either an injury or surgery.   (91545) HOME EXERCISE PROGRAM - Reviewed/Progressed HEP activities related to strengthening, flexibility, endurance, ROM performed to prevent loss of range of motion, maintain or improve muscular strength or increase flexibility, following either an injury or surgery.  (36831) NEUROMUSCULAR RE-EDUCATION - Therapeutic procedure, 1 or more areas, each 15 minutes; neuromuscular reeducation of movement, balance, coordination, kinesthetic sense, posture, and/or proprioception for sitting and/or standing activities  (16001) HOME EXERCISE PROGRAM - Reviewed/Progressed HEP activities related to neuromuscular reeducation of movement, balance, coordination, kinesthetic sense,

## 2024-06-20 ENCOUNTER — HOSPITAL ENCOUNTER (OUTPATIENT)
Dept: PHYSICAL THERAPY | Age: 24
Setting detail: THERAPIES SERIES
End: 2024-06-20
Attending: ORTHOPAEDIC SURGERY
Payer: COMMERCIAL

## 2024-06-21 ENCOUNTER — HOSPITAL ENCOUNTER (OUTPATIENT)
Dept: PHYSICAL THERAPY | Age: 24
Setting detail: THERAPIES SERIES
Discharge: HOME | End: 2024-06-21
Attending: ORTHOPAEDIC SURGERY
Payer: COMMERCIAL

## 2024-06-21 PROCEDURE — 97530 THERAPEUTIC ACTIVITIES: CPT

## 2024-06-21 PROCEDURE — 97112 NEUROMUSCULAR REEDUCATION: CPT

## 2024-06-21 NOTE — FLOWSHEET NOTE
[facility]- Outpatient Rehabilitation and Therapy [address] [place] [address] office: [phone] fax: [phone]           Physical Therapy: TREATMENT/PROGRESS NOTE   Patient: Drew Edmonds (23 y.o. male)   Examination Date: 2024   :  2000 MRN: [id number]   Visit #: 10   Insurance Allowable Auth Needed   MN []Yes    []No    Insurance: Payor: [employer] / Plan: [employer] HEALTHCARE / Product Type: *No Product type* /   Insurance ID: [id number] - (Commercial)  Secondary Insurance (if applicable):    Treatment Diagnosis:     ICD-10-CM    1. Acute left ankle pain  M25.572          Medical Diagnosis:  Sprain of anterior talofibular ligament of left ankle, initial encounter [S93.492A]  Sprain of deltoid ligament of right ankle, initial encounter [S93.421A]   Referring Physician: Deepthi Moody MD  PCP: Nazia Toney DO     Plan of care signed (Y/N):     Date of Patient follow up with Physician:      Progress Report/POC: NO  POC update due: (10 visits /OR AUTH LIMITS, whichever is less)  2024                                             Precautions/ Contra-indications:           Latex allergy:  NO  Pacemaker:    NO  Contraindications for Manipulation: None  Date of Surgery:   Other:    Red Flags:  None    C-SSRS Triggered by Intake questionnaire:   Patient answered 'NO' to both behavioral questions on intake.  No further screening warranted    Preferred Language for Healthcare:   [x] English       [] other:    SUBJECTIVE EXAMINATION     Today:  pt notes he had some increased pain from volunteering yesterday where he was on his feet for longer periods of time, does have MRI scheduled next week.         Test used Initial score  5/10/24 2024   Pain Summary VAS 6-7/10 1-2/10   Functional questionnaire LEFS 60%    Other:                  OBJECTIVE EXAMINATION     5/10/24  Joint mobility:    []Normal    [x]Hypo   []Hyper    Palpation:  TTP ATFL     Functional

## 2024-06-26 ENCOUNTER — HOSPITAL ENCOUNTER (OUTPATIENT)
Dept: MRI IMAGING | Age: 24
Discharge: HOME | End: 2024-06-26
Attending: ORTHOPAEDIC SURGERY
Payer: COMMERCIAL

## 2024-06-26 DIAGNOSIS — S93.492A SPRAIN OF ANTERIOR TALOFIBULAR LIGAMENT OF LEFT ANKLE, INITIAL ENCOUNTER: ICD-10-CM

## 2024-06-26 PROCEDURE — 73721 MRI JNT OF LWR EXTRE W/O DYE: CPT

## 2024-07-02 ENCOUNTER — HOSPITAL ENCOUNTER (OUTPATIENT)
Dept: PHYSICAL THERAPY | Age: 24
Setting detail: THERAPIES SERIES
Discharge: HOME | End: 2024-07-02
Attending: ORTHOPAEDIC SURGERY
Payer: COMMERCIAL

## 2024-07-02 PROCEDURE — 97530 THERAPEUTIC ACTIVITIES: CPT

## 2024-07-02 PROCEDURE — 97110 THERAPEUTIC EXERCISES: CPT

## 2024-07-02 NOTE — FLOWSHEET NOTE
[facility]- Outpatient Rehabilitation and Therapy [address] [place] [address] office: [phone] fax: [phone]           Physical Therapy: TREATMENT/PROGRESS NOTE   Patient: Drew Edmonds (24 y.o. male)   Examination Date: 2024   :  2000 MRN: [id number]   Visit #: 11   Insurance Allowable Auth Needed   MN []Yes    []No    Insurance: Payor: [employer] / Plan: UNITED HEALTHCARE / Product Type: *No Product type* /   Insurance ID: [id number] - (Commercial)  Secondary Insurance (if applicable):    Treatment Diagnosis:     ICD-10-CM    1. Acute left ankle pain  M25.572          Medical Diagnosis:  Sprain of anterior talofibular ligament of left ankle, initial encounter [S93.492A]  Sprain of deltoid ligament of right ankle, initial encounter [S93.421A]   Referring Physician: Deepthi Moody MD  PCP: Nazia Toney DO     Plan of care signed (Y/N):     Date of Patient follow up with Physician:      Progress Report/POC: NO  POC update due: (10 visits /OR AUTH LIMITS, whichever is less)  2024                                             Precautions/ Contra-indications:           Latex allergy:  NO  Pacemaker:    NO  Contraindications for Manipulation: None  Date of Surgery:   Other:    Red Flags:  None    C-SSRS Triggered by Intake questionnaire:   Patient answered 'NO' to both behavioral questions on intake.  No further screening warranted    Preferred Language for Healthcare:   [x] English       [] other:    SUBJECTIVE EXAMINATION     Today:   pt notes he had MRI done and saw results but hasn;t heard from MD. He notes overall he feels alright but did try a quick jog just to get across the street and had some pain.          Test used Initial score  5/10/24 2024   Pain Summary VAS 6-7/10 1-2/10   Functional questionnaire LEFS 60%    Other:                  OBJECTIVE EXAMINATION     24  Joint mobility:    []Normal    [x]Hypo   []Hyper    Palpation:  TTP

## 2024-07-10 ENCOUNTER — HOSPITAL ENCOUNTER (OUTPATIENT)
Dept: PHYSICAL THERAPY | Age: 24
Setting detail: THERAPIES SERIES
Discharge: HOME | End: 2024-07-10
Attending: ORTHOPAEDIC SURGERY
Payer: COMMERCIAL

## 2024-07-10 PROCEDURE — 97530 THERAPEUTIC ACTIVITIES: CPT

## 2024-07-10 PROCEDURE — 97110 THERAPEUTIC EXERCISES: CPT

## 2024-07-10 NOTE — FLOWSHEET NOTE
[facility]- Outpatient Rehabilitation and Therapy [address] [place] [address] office: [phone] fax: [phone]           Physical Therapy: TREATMENT/PROGRESS NOTE   Patient: Drew Edmonds (24 y.o. male)   Examination Date: 07/10/2024   :  2000 MRN: [id number]   Visit #: 12   Insurance Allowable Auth Needed   MN []Yes    []No    Insurance: Payor: [employer] / Plan: UNITED HEALTHCARE / Product Type: *No Product type* /   Insurance ID: [id number] - (Commercial)  Secondary Insurance (if applicable):    Treatment Diagnosis:     ICD-10-CM    1. Acute left ankle pain  M25.572          Medical Diagnosis:  Sprain of anterior talofibular ligament of left ankle, initial encounter [S93.492A]  Sprain of deltoid ligament of right ankle, initial encounter [S93.421A]   Referring Physician: Deepthi Moody MD  PCP: Nazia Toney DO     Plan of care signed (Y/N):     Date of Patient follow up with Physician:      Progress Report/POC: NO  POC update due: (10 visits /OR AUTH LIMITS, whichever is less)  2024                                             Precautions/ Contra-indications:           Latex allergy:  NO  Pacemaker:    NO  Contraindications for Manipulation: None  Date of Surgery:   Other:    Red Flags:  None    C-SSRS Triggered by Intake questionnaire:   Patient answered 'NO' to both behavioral questions on intake.  No further screening warranted    Preferred Language for Healthcare:   [x] English       [] other:    SUBJECTIVE EXAMINATION     Today:  pt notes he tried to run a mile over the weekend. He notes he made it about half a mile before he started to have discomfort        Test used Initial score  5/10/24 07/10/2024   Pain Summary VAS 6-7/10 1-2/10   Functional questionnaire LEFS 60%    Other:                  OBJECTIVE EXAMINATION     24  Joint mobility:    []Normal    [x]Hypo   []Hyper    Palpation:  TTP ATFL     Functional Mobility/Transfers:     Posture:

## 2024-07-18 ENCOUNTER — HOSPITAL ENCOUNTER (OUTPATIENT)
Dept: PHYSICAL THERAPY | Age: 24
Setting detail: THERAPIES SERIES
Discharge: HOME | End: 2024-07-18
Attending: ORTHOPAEDIC SURGERY
Payer: COMMERCIAL

## 2024-07-18 PROCEDURE — 97110 THERAPEUTIC EXERCISES: CPT

## 2024-07-18 PROCEDURE — 97530 THERAPEUTIC ACTIVITIES: CPT

## 2024-07-18 NOTE — PLAN OF CARE
[facility]- Outpatient Rehabilitation and Therapy [address] [place] [address] office: [phone] fax: [phone]       Physical Therapy Re-Certification Plan of Care/MD UPDATE      Dear  Dr. Moody    We had the pleasure of treating the following patient for physical therapy services at [facility].  A summary of our findings can be found in the updated assessment below.  This includes our plan of care.  If you have any questions or concerns regarding these findings, please do not hesitate to contact me at the office phone number checked above.  Thank you for the referral.     Physician Signature:________________________________Date:__________________  By signing above (or electronic signature), therapist’s plan is approved by physician    Date Range Of Visits: 5/10/24-24  Total Visits to Date: 13  Overall Response to Treatment:   []Patient is responding well to treatment and improvement is noted with regards  to goals   []Patient should continue to improve in reasonable time if they continue HEP   []Patient has plateaued and is no longer responding to skilled PT intervention    []Patient is getting worse and would benefit from return to referring MD   []Patient unable to adhere to initial POC   []Other: Pt shows improved ROM, strength, balance/proprioception. He notes no issues with ADLs, ambulation but has struggled with getting back to recreational exercise and rock climbing. He has been able to slowly progress jogging intervals to 90% on alterG painfree. Pt would continue to benefit from skilled intervention 1x per every other week for 1 month in order to progress towards full return to independent recreational activities.     Physical Therapy: TREATMENT/PROGRESS NOTE   Patient: Drew Edmonds (24 y.o. male)   Examination Date: 2024   :  2000 MRN: [id number]   Visit #: 13   Insurance Allowable Auth Needed   MN []Yes    []No    Insurance: Payor: 
abnormal

## 2024-07-31 ENCOUNTER — HOSPITAL ENCOUNTER (OUTPATIENT)
Dept: PHYSICAL THERAPY | Age: 24
Setting detail: THERAPIES SERIES
Discharge: HOME | End: 2024-07-31
Attending: ORTHOPAEDIC SURGERY
Payer: COMMERCIAL

## 2024-07-31 PROCEDURE — 97530 THERAPEUTIC ACTIVITIES: CPT

## 2024-07-31 PROCEDURE — 97110 THERAPEUTIC EXERCISES: CPT

## 2024-07-31 NOTE — FLOWSHEET NOTE
[facility]- Outpatient Rehabilitation and Therapy [address] [place] [address] office: [phone] fax: [phone]         Physical Therapy: TREATMENT/PROGRESS NOTE   Patient: Drew Edmonds (24 y.o. male)   Examination Date: 2024   :  2000 MRN: [id number]   Visit #: 14   Insurance Allowable Auth Needed   MN []Yes    []No    Insurance: Payor: [employer] HE[employer]CARE / Plan: UNITED HEALTHCARE / Product Type: *No Product type* /   Insurance ID: [id number] - (Commercial)  Secondary Insurance (if applicable):    Treatment Diagnosis:     ICD-10-CM    1. Acute left ankle pain  M25.572          Medical Diagnosis:  Sprain of anterior talofibular ligament of left ankle, initial encounter [S93.492A]  Sprain of deltoid ligament of right ankle, initial encounter [S93.421A]   Referring Physician: Deepthi Moody MD  PCP: Nazia Toney DO     Plan of care signed (Y/N):     Date of Patient follow up with Physician:      Progress Report/POC: NO  POC update due: (10 visits /OR AUTH LIMITS, whichever is less)  2024                                             Precautions/ Contra-indications:           Latex allergy:  NO  Pacemaker:    NO  Contraindications for Manipulation: None  Date of Surgery:   Other:    Red Flags:  None    C-SSRS Triggered by Intake questionnaire:   Patient answered 'NO' to both behavioral questions on intake.  No further screening warranted    Preferred Language for Healthcare:   [x] English       [] other:    SUBJECTIVE EXAMINATION     Today:  pt notes that he has been doing well with increased jogging and hiking on his on. He notes he had some soreness after hike but he was carrying cooler and backpack as added weight.        Test used Initial score  5/10/24 2024   Pain Summary VAS 6-7/10 1-2/10   Functional questionnaire LEFS 60% 10%   Other:                  OBJECTIVE EXAMINATION     24  Joint mobility:    []Normal    [x]Hypo   []Hyper    Palpation:

## 2024-08-29 DIAGNOSIS — Z83.2 FAMILY HISTORY OF FACTOR V LEIDEN MUTATION: ICD-10-CM

## 2024-08-29 DIAGNOSIS — Z00.00 ROUTINE PHYSICAL EXAMINATION: ICD-10-CM

## 2024-08-30 LAB
ALBUMIN SERPL-MCNC: 4.7 G/DL (ref 3.4–5)
ALBUMIN/GLOB SERPL: 1.7 {RATIO} (ref 1.1–2.2)
ALP SERPL-CCNC: 70 U/L (ref 40–129)
ALT SERPL-CCNC: 19 U/L (ref 10–40)
ANION GAP SERPL CALCULATED.3IONS-SCNC: 12 MMOL/L (ref 3–16)
AST SERPL-CCNC: 20 U/L (ref 15–37)
BACTERIA URNS QL MICRO: NORMAL /HPF
BASOPHILS # BLD: 0 K/UL (ref 0–0.2)
BASOPHILS NFR BLD: 0.6 %
BILIRUB SERPL-MCNC: 0.7 MG/DL (ref 0–1)
BILIRUB UR QL STRIP.AUTO: NEGATIVE
BUN SERPL-MCNC: 11 MG/DL (ref 7–20)
C TRACH DNA UR QL NAA+PROBE: NEGATIVE
CALCIUM SERPL-MCNC: 9.6 MG/DL (ref 8.3–10.6)
CHLORIDE SERPL-SCNC: 99 MMOL/L (ref 99–110)
CHOLEST SERPL-MCNC: 127 MG/DL (ref 0–199)
CLARITY UR: CLEAR
CO2 SERPL-SCNC: 26 MMOL/L (ref 21–32)
COLOR UR: YELLOW
CREAT SERPL-MCNC: 0.8 MG/DL (ref 0.9–1.3)
DEPRECATED RDW RBC AUTO: 12.3 % (ref 12.4–15.4)
EOSINOPHIL # BLD: 0.2 K/UL (ref 0–0.6)
EOSINOPHIL NFR BLD: 2.8 %
EPI CELLS #/AREA URNS AUTO: 0 /HPF (ref 0–5)
GFR SERPLBLD CREATININE-BSD FMLA CKD-EPI: >90 ML/MIN/{1.73_M2}
GLUCOSE SERPL-MCNC: 78 MG/DL (ref 70–99)
GLUCOSE UR STRIP.AUTO-MCNC: NEGATIVE MG/DL
HCT VFR BLD AUTO: 46.4 % (ref 40.5–52.5)
HCV AB SERPL QL IA: NORMAL
HDLC SERPL-MCNC: 45 MG/DL (ref 40–60)
HGB BLD-MCNC: 15.9 G/DL (ref 13.5–17.5)
HGB UR QL STRIP.AUTO: NEGATIVE
HIV 1+2 AB+HIV1 P24 AG SERPL QL IA: NORMAL
HIV 2 AB SERPL QL IA: NORMAL
HIV1 AB SERPL QL IA: NORMAL
HIV1 P24 AG SERPL QL IA: NORMAL
HYALINE CASTS #/AREA URNS AUTO: 0 /LPF (ref 0–8)
KETONES UR STRIP.AUTO-MCNC: NEGATIVE MG/DL
LDL CHOLESTEROL: 70 MG/DL
LEUKOCYTE ESTERASE UR QL STRIP.AUTO: NEGATIVE
LYMPHOCYTES # BLD: 2.3 K/UL (ref 1–5.1)
LYMPHOCYTES NFR BLD: 37.2 %
MCH RBC QN AUTO: 30.7 PG (ref 26–34)
MCHC RBC AUTO-ENTMCNC: 34.2 G/DL (ref 31–36)
MCV RBC AUTO: 90 FL (ref 80–100)
MONOCYTES # BLD: 0.4 K/UL (ref 0–1.3)
MONOCYTES NFR BLD: 6.8 %
N GONORRHOEA DNA UR QL NAA+PROBE: NEGATIVE
NEUTROPHILS # BLD: 3.2 K/UL (ref 1.7–7.7)
NEUTROPHILS NFR BLD: 52.6 %
NITRITE UR QL STRIP.AUTO: NEGATIVE
PH UR STRIP.AUTO: 6.5 [PH] (ref 5–8)
PLATELET # BLD AUTO: 312 K/UL (ref 135–450)
PMV BLD AUTO: 8.4 FL (ref 5–10.5)
POTASSIUM SERPL-SCNC: 4.3 MMOL/L (ref 3.5–5.1)
PROT SERPL-MCNC: 7.4 G/DL (ref 6.4–8.2)
PROT UR STRIP.AUTO-MCNC: NEGATIVE MG/DL
RBC # BLD AUTO: 5.16 M/UL (ref 4.2–5.9)
RBC CLUMPS #/AREA URNS AUTO: 1 /HPF (ref 0–4)
REAGIN+T PALLIDUM IGG+IGM SERPL-IMP: NORMAL
SODIUM SERPL-SCNC: 137 MMOL/L (ref 136–145)
SP GR UR STRIP.AUTO: 1.01 (ref 1–1.03)
TRIGL SERPL-MCNC: 60 MG/DL (ref 0–150)
UA DIPSTICK W REFLEX MICRO PNL UR: NORMAL
URN SPEC COLLECT METH UR: NORMAL
UROBILINOGEN UR STRIP-ACNC: 0.2 E.U./DL
VLDLC SERPL CALC-MCNC: 12 MG/DL
WBC # BLD AUTO: 6.1 K/UL (ref 4–11)
WBC #/AREA URNS AUTO: 0 /HPF (ref 0–5)

## 2024-09-03 LAB
F5 P.R506Q BLD/T QL: ABNORMAL
SPECIMEN SOURCE: ABNORMAL

## 2024-09-25 ENCOUNTER — OFFICE VISIT (OUTPATIENT)
Dept: PRIMARY CARE CLINIC | Age: 24
End: 2024-09-25

## 2024-09-25 VITALS
OXYGEN SATURATION: 96 % | WEIGHT: 159.4 LBS | DIASTOLIC BLOOD PRESSURE: 65 MMHG | HEIGHT: 74 IN | TEMPERATURE: 98.6 F | BODY MASS INDEX: 20.46 KG/M2 | HEART RATE: 69 BPM | SYSTOLIC BLOOD PRESSURE: 103 MMHG

## 2024-09-25 DIAGNOSIS — D68.51 HETEROZYGOUS FACTOR V LEIDEN MUTATION (HCC): ICD-10-CM

## 2024-09-25 DIAGNOSIS — Z30.2 ENCOUNTER FOR MALE STERILIZATION PROCEDURE: ICD-10-CM

## 2024-09-25 DIAGNOSIS — Z00.00 ROUTINE PHYSICAL EXAMINATION: Primary | ICD-10-CM

## 2024-09-26 PROBLEM — S93.421A SPRAIN OF DELTOID LIGAMENT OF RIGHT ANKLE: Status: RESOLVED | Noted: 2024-04-11 | Resolved: 2024-09-26

## 2024-09-26 PROBLEM — S93.492A SPRAIN OF ANTERIOR TALOFIBULAR LIGAMENT OF LEFT ANKLE: Status: RESOLVED | Noted: 2024-04-12 | Resolved: 2024-09-26

## 2024-09-26 PROBLEM — D68.51 HETEROZYGOUS FACTOR V LEIDEN MUTATION (HCC): Status: ACTIVE | Noted: 2024-09-26

## 2024-09-26 PROBLEM — M79.672 LEFT FOOT PAIN: Status: RESOLVED | Noted: 2024-04-11 | Resolved: 2024-09-26

## 2024-09-26 PROBLEM — Z83.2 FAMILY HISTORY OF FACTOR V LEIDEN MUTATION: Status: RESOLVED | Noted: 2023-10-31 | Resolved: 2024-09-26

## 2024-09-26 ASSESSMENT — ENCOUNTER SYMPTOMS
COUGH: 0
VOMITING: 0
NAUSEA: 0
DIARRHEA: 0
ABDOMINAL PAIN: 0
SHORTNESS OF BREATH: 0

## 2024-11-05 ENCOUNTER — TELEPHONE (OUTPATIENT)
Dept: ORTHOPEDIC SURGERY | Age: 24
End: 2024-11-05

## 2024-11-05 NOTE — TELEPHONE ENCOUNTER
Patient scheduled for 11/6/24 at [place] at 8:30 AM.     Patient was seen at [facility] earlier today.  Call [phone] and request radiology over the ED for images to be sent.     Called and was transferred to a number that rang several times but was never answered. Will call again later this afternoon.

## 2024-11-05 NOTE — TELEPHONE ENCOUNTER
Appointment Request     Patient requesting earlier appointment: Yes  Appointment offered to patient: YES  Patient Contact Number: [phone]    ELBOW FRACTURE   WEST/MORNING

## 2024-11-06 ENCOUNTER — OFFICE VISIT (OUTPATIENT)
Dept: ORTHOPEDIC SURGERY | Age: 24
End: 2024-11-06

## 2024-11-06 VITALS — BODY MASS INDEX: 20.41 KG/M2 | WEIGHT: 159 LBS | HEIGHT: 74 IN

## 2024-11-06 DIAGNOSIS — S52.042A CLOSED DISPLACED FRACTURE OF CORONOID PROCESS OF LEFT ULNA, INITIAL ENCOUNTER: Primary | ICD-10-CM

## 2024-11-06 NOTE — PROGRESS NOTES
CHIEF COMPLAINT: Left elbow pain/ minimally displaced ulna cornoid fracture, possible elbow dislocation.    DATE OF INJURY: 2024    HISTORY:  Mr. Edmonds is a 24 y.o.  male right handed who presents today for evaluation of a left elbow injury. The patient reports that this injury occurred when he fell skateboarding.  He was first seen and evaluated in [facility], when he was x-rayed and splinted, and asked to f/u with Orthopedics. Pain lateral left elbow is sharp, rated 6/10 and radiate to forearm. Movement makes the pain worse, and resting makes the pain better. No numbness or tingling sensation.  The patient denies any other injuries.     Past Medical History:   Diagnosis Date    ADHD (attention deficit hyperactivity disorder) 0337-5397    Stickler syndrome        Past Surgical History:   Procedure Laterality Date    CLEFT PALATE REPAIR      + hemangioma removal    COSMETIC SURGERY      DENTAL SURGERY      EYE SURGERY      MANDIBLE SURGERY      MYRINGOTOMY AND TYMPANOSTOMY TUBE PLACEMENT      OTHER SURGICAL HISTORY      Left eyebrow laceration repair    RETINAL DETACHMENT SURGERY Left        Social History     Socioeconomic History    Marital status: Single     Spouse name: Not on file    Number of children: Not on file    Years of education: Not on file    Highest education level: Not on file   Occupational History    Not on file   Tobacco Use    Smoking status: Former     Current packs/day: 0.00     Average packs/day: 0.5 packs/day for 6.8 years (3.4 ttl pk-yrs)     Types: Cigarettes, E-Cigarettes     Start date: 2017     Quit date: 2023     Years since quittin.0    Smokeless tobacco: Never   Substance and Sexual Activity    Alcohol use: Yes     Alcohol/week: 16.0 standard drinks of alcohol     Types: 16 Cans of beer per week    Drug use: Not Currently    Sexual activity: Yes     Partners: Female   Other Topics Concern    Not on file   Social History Narrative    Not on file

## 2024-11-13 ENCOUNTER — OFFICE VISIT (OUTPATIENT)
Dept: ORTHOPEDIC SURGERY | Age: 24
End: 2024-11-13

## 2024-11-13 VITALS — BODY MASS INDEX: 20.41 KG/M2 | HEIGHT: 74 IN | WEIGHT: 159 LBS

## 2024-11-13 DIAGNOSIS — S52.042A CLOSED DISPLACED FRACTURE OF CORONOID PROCESS OF LEFT ULNA, INITIAL ENCOUNTER: Primary | ICD-10-CM

## 2024-11-13 NOTE — PROGRESS NOTES
CHIEF COMPLAINT: Left elbow pain/ minimally displaced ulna cornoid fracture, possible elbow dislocation.    DATE OF INJURY: 2024    HISTORY:  Mr. Edmonds is a 24 y.o.  male right handed who presents today for follow up evaluation of a left elbow injury. The patient reports that this injury occurred when he fell skateboarding.  He was first seen and evaluated in [facility], when he was x-rayed and splinted, and asked to f/u with Orthopedics. Pain lateral left elbow is sharp, rated 3/10 and radiate to forearm. Movement makes the pain worse, and resting makes the pain better. No numbness or tingling sensation.  The patient denies any other injuries. He is in a splint.     Past Medical History:   Diagnosis Date    ADHD (attention deficit hyperactivity disorder) 3378-4177    Stickler syndrome        Past Surgical History:   Procedure Laterality Date    CLEFT PALATE REPAIR      + hemangioma removal    COSMETIC SURGERY      DENTAL SURGERY      EYE SURGERY      MANDIBLE SURGERY      MYRINGOTOMY AND TYMPANOSTOMY TUBE PLACEMENT      OTHER SURGICAL HISTORY      Left eyebrow laceration repair    RETINAL DETACHMENT SURGERY Left        Social History     Socioeconomic History    Marital status: Single     Spouse name: Not on file    Number of children: Not on file    Years of education: Not on file    Highest education level: Not on file   Occupational History    Not on file   Tobacco Use    Smoking status: Former     Current packs/day: 0.00     Average packs/day: 0.5 packs/day for 6.8 years (3.4 ttl pk-yrs)     Types: Cigarettes, E-Cigarettes     Start date: 2017     Quit date: 2023     Years since quittin.0    Smokeless tobacco: Never   Substance and Sexual Activity    Alcohol use: Yes     Alcohol/week: 16.0 standard drinks of alcohol     Types: 16 Cans of beer per week    Drug use: Not Currently    Sexual activity: Yes     Partners: Female   Other Topics Concern    Not on file   Social

## 2024-11-20 ENCOUNTER — OFFICE VISIT (OUTPATIENT)
Dept: ORTHOPEDIC SURGERY | Age: 24
End: 2024-11-20
Payer: COMMERCIAL

## 2024-11-20 VITALS — WEIGHT: 158.95 LBS | HEIGHT: 74 IN | BODY MASS INDEX: 20.4 KG/M2

## 2024-11-20 DIAGNOSIS — M25.522 LEFT ELBOW PAIN: Primary | ICD-10-CM

## 2024-11-20 PROCEDURE — G8420 CALC BMI NORM PARAMETERS: HCPCS | Performed by: ORTHOPAEDIC SURGERY

## 2024-11-20 PROCEDURE — 1036F TOBACCO NON-USER: CPT | Performed by: ORTHOPAEDIC SURGERY

## 2024-11-20 PROCEDURE — G8427 DOCREV CUR MEDS BY ELIG CLIN: HCPCS | Performed by: ORTHOPAEDIC SURGERY

## 2024-11-20 PROCEDURE — 99213 OFFICE O/P EST LOW 20 MIN: CPT | Performed by: ORTHOPAEDIC SURGERY

## 2024-11-20 PROCEDURE — G8484 FLU IMMUNIZE NO ADMIN: HCPCS | Performed by: ORTHOPAEDIC SURGERY

## 2024-11-20 NOTE — PROGRESS NOTES
CHIEF COMPLAINT: Left elbow pain/ minimally displaced ulna cornoid fracture, possible elbow dislocation.    DATE OF INJURY: 2024    HISTORY:  Mr. Edmonds is a 24 y.o.  male right handed who presents today for follow up evaluation of a left elbow injury. The patient reports that this injury occurred when he fell skateboarding.  He was first seen and evaluated in [facility], when he was x-rayed and splinted, and asked to f/u with Orthopedics. Pain lateral left elbow is sharp, rated 3/10 and radiate to forearm. Movement makes the pain worse, and resting makes the pain better. No numbness or tingling sensation.  The patient denies any other injuries. He is in a splint.     Past Medical History:   Diagnosis Date    ADHD (attention deficit hyperactivity disorder) 7358-1576    Stickler syndrome        Past Surgical History:   Procedure Laterality Date    CLEFT PALATE REPAIR      + hemangioma removal    COSMETIC SURGERY      DENTAL SURGERY      EYE SURGERY      MANDIBLE SURGERY      MYRINGOTOMY AND TYMPANOSTOMY TUBE PLACEMENT      OTHER SURGICAL HISTORY      Left eyebrow laceration repair    RETINAL DETACHMENT SURGERY Left        Social History     Socioeconomic History    Marital status: Single     Spouse name: Not on file    Number of children: Not on file    Years of education: Not on file    Highest education level: Not on file   Occupational History    Not on file   Tobacco Use    Smoking status: Former     Current packs/day: 0.00     Average packs/day: 0.5 packs/day for 6.8 years (3.4 ttl pk-yrs)     Types: Cigarettes, E-Cigarettes     Start date: 2017     Quit date: 2023     Years since quittin.0    Smokeless tobacco: Never   Substance and Sexual Activity    Alcohol use: Yes     Alcohol/week: 16.0 standard drinks of alcohol     Types: 16 Cans of beer per week    Drug use: Not Currently    Sexual activity: Yes     Partners: Female   Other Topics Concern    Not on file   Social

## 2024-12-11 ENCOUNTER — OFFICE VISIT (OUTPATIENT)
Dept: ORTHOPEDIC SURGERY | Age: 24
End: 2024-12-11
Payer: COMMERCIAL

## 2024-12-11 VITALS — HEIGHT: 74 IN | BODY MASS INDEX: 20.28 KG/M2 | WEIGHT: 158 LBS | RESPIRATION RATE: 16 BRPM

## 2024-12-11 DIAGNOSIS — S52.042A CLOSED DISPLACED FRACTURE OF CORONOID PROCESS OF LEFT ULNA, INITIAL ENCOUNTER: Primary | ICD-10-CM

## 2024-12-11 PROCEDURE — 1036F TOBACCO NON-USER: CPT | Performed by: ORTHOPAEDIC SURGERY

## 2024-12-11 PROCEDURE — G8420 CALC BMI NORM PARAMETERS: HCPCS | Performed by: ORTHOPAEDIC SURGERY

## 2024-12-11 PROCEDURE — 99214 OFFICE O/P EST MOD 30 MIN: CPT | Performed by: ORTHOPAEDIC SURGERY

## 2024-12-11 PROCEDURE — G8484 FLU IMMUNIZE NO ADMIN: HCPCS | Performed by: ORTHOPAEDIC SURGERY

## 2024-12-11 PROCEDURE — G8427 DOCREV CUR MEDS BY ELIG CLIN: HCPCS | Performed by: ORTHOPAEDIC SURGERY

## 2024-12-11 NOTE — PROGRESS NOTES
CHIEF COMPLAINT: Left elbow pain/ minimally displaced ulna cornoid fracture, possible elbow dislocation.    DATE OF INJURY: 2024    HISTORY:  Mr. Edmonds is a 24 y.o.  male right handed who presents today for follow up evaluation of a left elbow injury. The patient reports that this injury occurred when he fell skateboarding.  He was first seen and evaluated in [facility], when he was x-rayed and splinted, and asked to f/u with Orthopedics. Pain lateral left elbow is sharp, rated 3/10 and radiate to forearm. Movement makes the pain worse, and resting makes the pain better. No numbness or tingling sensation.  The patient denies any other injuries. He is in a splint.     Past Medical History:   Diagnosis Date    ADHD (attention deficit hyperactivity disorder) 9768-9606    Stickler syndrome        Past Surgical History:   Procedure Laterality Date    CLEFT PALATE REPAIR      + hemangioma removal    COSMETIC SURGERY      DENTAL SURGERY      EYE SURGERY      MANDIBLE SURGERY      MYRINGOTOMY AND TYMPANOSTOMY TUBE PLACEMENT      OTHER SURGICAL HISTORY      Left eyebrow laceration repair    RETINAL DETACHMENT SURGERY Left        Social History     Socioeconomic History    Marital status: Single     Spouse name: Not on file    Number of children: Not on file    Years of education: Not on file    Highest education level: Not on file   Occupational History    Not on file   Tobacco Use    Smoking status: Former     Current packs/day: 0.00     Average packs/day: 0.5 packs/day for 6.8 years (3.4 ttl pk-yrs)     Types: Cigarettes, E-Cigarettes     Start date: 2017     Quit date: 2023     Years since quittin.1    Smokeless tobacco: Never   Substance and Sexual Activity    Alcohol use: Yes     Alcohol/week: 16.0 standard drinks of alcohol     Types: 16 Cans of beer per week    Drug use: Not Currently    Sexual activity: Yes     Partners: Female   Other Topics Concern    Not on file   Social

## 2024-12-12 ENCOUNTER — HOSPITAL ENCOUNTER (OUTPATIENT)
Dept: PHYSICAL THERAPY | Age: 24
Setting detail: THERAPIES SERIES
Discharge: HOME | End: 2024-12-12
Payer: COMMERCIAL

## 2024-12-12 DIAGNOSIS — M25.622 ELBOW STIFFNESS, LEFT: ICD-10-CM

## 2024-12-12 DIAGNOSIS — M25.522 LEFT ELBOW PAIN: Primary | ICD-10-CM

## 2024-12-12 PROCEDURE — 97110 THERAPEUTIC EXERCISES: CPT

## 2024-12-12 PROCEDURE — 97161 PT EVAL LOW COMPLEX 20 MIN: CPT

## 2024-12-12 NOTE — PLAN OF CARE
[facility]- Outpatient Rehabilitation and Therapy [address] [place] [id number] office: [phone] fax: [phone]     Physical Therapy Initial Evaluation Certification      Dear Deepthi Moody MD,    We had the pleasure of evaluating the following patient for physical therapy services at [facility] Outpatient Physical Therapy.  A summary of our findings can be found in the initial assessment below.  This includes our plan of care.  If you have any questions or concerns regarding these findings, please do not hesitate to contact me at the office phone number listed above.  Thank you for the referral.     Physician Signature:_______________________________Date:__________________  By signing above (or electronic signature), therapist’s plan is approved by physician       Physical Therapy: TREATMENT/PROGRESS NOTE   Patient: Drew Edmonds (24 y.o. male)   Examination Date: 2024   :  2000 MRN: [id number]      Insurance: Payor: [employer] / Plan: [employer] - CHOICE PLUS / Product Type: *No Product type* /   Insurance ID: [id number] - (Commercial)  Secondary Insurance (if applicable):    Treatment Diagnosis:     ICD-10-CM    1. Left elbow pain  M25.522       2. Elbow stiffness, left  M25.622          Medical Diagnosis:  Closed displaced fracture of coronoid process of left ulna, initial encounter [S52.042A]   Referring Physician: Deepthi Moody MD  PCP: Nazia Toney DO     Plan of care signed (Y/N):     Date of Patient follow up with Physician:      Progress Report/POC: EVAL today  POC update due: (10 visits /OR AUTH LIMITS, whichever is less)  1/10/2025      Medical History:  Comorbidities:  None  Relevant Medical History:                                            Precautions/ Contra-indications:           Latex allergy:  NO  Pacemaker:    NO  Contraindications for Manipulation: None  Date of Surgery:   Other:    Red Flags:  None    Suicide Screening:

## 2024-12-17 ENCOUNTER — HOSPITAL ENCOUNTER (OUTPATIENT)
Dept: PHYSICAL THERAPY | Age: 24
Setting detail: THERAPIES SERIES
Discharge: HOME | End: 2024-12-17
Payer: COMMERCIAL

## 2024-12-17 PROCEDURE — 97110 THERAPEUTIC EXERCISES: CPT

## 2024-12-17 PROCEDURE — 97140 MANUAL THERAPY 1/> REGIONS: CPT

## 2024-12-17 NOTE — FLOWSHEET NOTE
[facility]- Outpatient Rehabilitation and Therapy [address] [place] [address] office: [phone] fax: [phone]         Physical Therapy: TREATMENT/PROGRESS NOTE   Patient: Drew Edmonds (24 y.o. male)   Examination Date: 2024   :  2000 MRN: [id number]      Insurance: Payor: [employer] / Plan: [employer] - CHOICE PLUS / Product Type: *No Product type* /   Insurance ID: [id number] - (Commercial)  Secondary Insurance (if applicable):    Treatment Diagnosis:     ICD-10-CM    1. Left elbow pain  M25.522       2. Elbow stiffness, left  M25.622          Medical Diagnosis:  Closed displaced fracture of coronoid process of left ulna, initial encounter [S52.042A]   Referring Physician: Deepthi Moody MD  PCP: Nazia Toney DO     Plan of care signed (Y/N):     Date of Patient follow up with Physician:      Progress Report/POC: EVAL today  POC update due: (10 visits /OR AUTH LIMITS, whichever is less)  1/10/2025      Medical History:  Comorbidities:  None  Relevant Medical History:                                            Precautions/ Contra-indications:           Latex allergy:  NO  Pacemaker:    NO  Contraindications for Manipulation: None  Date of Surgery:   Other:    Red Flags:  None    Suicide Screening:   The patient did not verbalize a primary behavioral concern, suicidal ideation, suicidal intent, or demonstrate suicidal behaviors.    Preferred Language for Healthcare:   [x] English       [] other:      Falls Risk Assessment (30 days):   Falls Risk assessed and no intervention required.  Time Up and Go (TUG):   Not Assessed     Visit #:   Insurance Allowable Auth Needed   2 [place] []Yes    []No     SUBJECTIVE EXAMINATION     Patient stated complaint: pt notes he is moving a bit better, has focused on elbow extension, flexion          Functional questionnaire: Upper Extremity functional Scale score 33%   Date: 24    Pain: 0-1/10; 6/10 with reaching

## 2024-12-18 NOTE — FLOWSHEET NOTE
[facility]- Outpatient Rehabilitation and Therapy [address] [place] [address] office: [phone] fax: [phone]         Physical Therapy: TREATMENT/PROGRESS NOTE   Patient: Drew Edmonds (24 y.o. male)   Examination Date: 2024   :  2000 MRN: [id number]      Insurance: Payor: [employer] / Plan: [employer] HE[employer] - CHOICE PLUS / Product Type: *No Product type* /   Insurance ID: [id number] - (Commercial)  Secondary Insurance (if applicable):    Treatment Diagnosis:     ICD-10-CM    1. Left elbow pain  M25.522       2. Elbow stiffness, left  M25.622          Medical Diagnosis:  Closed displaced fracture of coronoid process of left ulna, initial encounter [S52.042A]   Referring Physician: Deepthi Moody MD  PCP: Nazia Toney DO     Plan of care signed (Y/N):     Date of Patient follow up with Physician:      Progress Report/POC: EVAL today  POC update due: (10 visits /OR AUTH LIMITS, whichever is less)  1/10/2025      Medical History:  Comorbidities:  None  Relevant Medical History:                                            Precautions/ Contra-indications:           Latex allergy:  NO  Pacemaker:    NO  Contraindications for Manipulation: None  Date of Surgery:   Other:    Red Flags:  None    Suicide Screening:   The patient did not verbalize a primary behavioral concern, suicidal ideation, suicidal intent, or demonstrate suicidal behaviors.    Preferred Language for Healthcare:   [x] English       [] other:      Falls Risk Assessment (30 days):   Falls Risk assessed and no intervention required.  Time Up and Go (TUG):   Not Assessed     Visit #:   Insurance Allowable Auth Needed   3 [place] []Yes    []No     SUBJECTIVE EXAMINATION     Patient stated complaint: pt notes he had some moderate soreness immediately following last session but it did not last after a few hours. He notes he is feeling better today with improved motion.         Functional questionnaire:

## 2024-12-19 ENCOUNTER — HOSPITAL ENCOUNTER (OUTPATIENT)
Dept: PHYSICAL THERAPY | Age: 24
Setting detail: THERAPIES SERIES
Discharge: HOME | End: 2024-12-19
Payer: COMMERCIAL

## 2024-12-19 PROCEDURE — 97530 THERAPEUTIC ACTIVITIES: CPT

## 2024-12-19 PROCEDURE — 97140 MANUAL THERAPY 1/> REGIONS: CPT

## 2024-12-19 PROCEDURE — 97110 THERAPEUTIC EXERCISES: CPT

## 2024-12-23 ENCOUNTER — HOSPITAL ENCOUNTER (OUTPATIENT)
Dept: PHYSICAL THERAPY | Age: 24
Setting detail: THERAPIES SERIES
Discharge: HOME | End: 2024-12-23
Payer: COMMERCIAL

## 2024-12-23 PROCEDURE — 97530 THERAPEUTIC ACTIVITIES: CPT

## 2024-12-23 PROCEDURE — 97110 THERAPEUTIC EXERCISES: CPT

## 2024-12-23 NOTE — FLOWSHEET NOTE
elbow to 0-130+ without pain to allow for proper joint functioning to enable patient to perform ADLs with reaching/lifting/carrying.   [] Progressing: [] Met: [] Not Met: [] Adjusted  3. Patient will demonstrate increased Strength of LUE to 4+/5 or greater to allow for proper functional mobility to enable patient to return to perform weight bearing tasks through UE .   [] Progressing: [] Met: [] Not Met: [] Adjusted  4. Patient will return to recreational activities such as rock climbing without increased symptoms or restriction.   [] Progressing: [] Met: [] Not Met: [] Adjusted      Overall Progression Towards Functional goals/ Treatment Progress Update:  [] Patient is progressing as expected towards functional goals listed.    [] Progression is slowed due to complexities/Impairments listed.  [] Progression has been slowed due to co-morbidities.  [x] Plan just implemented, too soon (<30days) to assess goals progression   [] Goals require adjustment due to lack of progress  [] Patient is not progressing as expected and requires additional follow up with physician  [] Other:     TREATMENT PLAN     Frequency/Duration: 1-2x/week for 8-10 weeks for the following treatment interventions:    Interventions:  Therapeutic Exercise (62627) including: strength training, ROM, and functional mobility  Therapeutic Activities (02800) including: functional mobility training and education.  Neuromuscular Re-education (77586) activation and proprioception, including postural re-education.    Manual Therapy (31584) as indicated to include: Passive Range of Motion, Gr I-IV mobilizations, Soft Tissue Mobilization, and Dry Needling/IASTM  Modalities as needed that may include: Cryotherapy and Electrical Stimulation  Patient education on joint protection, postural re-education, activity modification, and progression of HEP    Plan: POC initiated as per evaluation    Electronically Signed by Radha Álvarez, PT    Radha Álvarez, PT,

## 2025-01-07 ENCOUNTER — HOSPITAL ENCOUNTER (OUTPATIENT)
Dept: PHYSICAL THERAPY | Age: 25
Setting detail: THERAPIES SERIES
Discharge: HOME | End: 2025-01-07
Payer: COMMERCIAL

## 2025-01-07 PROCEDURE — 97530 THERAPEUTIC ACTIVITIES: CPT

## 2025-01-07 PROCEDURE — 97110 THERAPEUTIC EXERCISES: CPT

## 2025-01-07 PROCEDURE — 97140 MANUAL THERAPY 1/> REGIONS: CPT

## 2025-01-07 NOTE — FLOWSHEET NOTE
activities such as ADLs, recreational activities .  [] Progressing: [] Met: [] Not Met: [] Adjusted  2. Patient will demonstrate increased AROM of L elbow to 0-130+ without pain to allow for proper joint functioning to enable patient to perform ADLs with reaching/lifting/carrying.   [] Progressing: [] Met: [] Not Met: [] Adjusted  3. Patient will demonstrate increased Strength of LUE to 4+/5 or greater to allow for proper functional mobility to enable patient to return to perform weight bearing tasks through UE .   [] Progressing: [] Met: [] Not Met: [] Adjusted  4. Patient will return to recreational activities such as rock climbing without increased symptoms or restriction.   [] Progressing: [] Met: [] Not Met: [] Adjusted      Overall Progression Towards Functional goals/ Treatment Progress Update:  [] Patient is progressing as expected towards functional goals listed.    [] Progression is slowed due to complexities/Impairments listed.  [] Progression has been slowed due to co-morbidities.  [x] Plan just implemented, too soon (<30days) to assess goals progression   [] Goals require adjustment due to lack of progress  [] Patient is not progressing as expected and requires additional follow up with physician  [] Other:     TREATMENT PLAN     Frequency/Duration: 1-2x/week for 8-10 weeks for the following treatment interventions:    Interventions:  Therapeutic Exercise (73777) including: strength training, ROM, and functional mobility  Therapeutic Activities (62192) including: functional mobility training and education.  Neuromuscular Re-education (82492) activation and proprioception, including postural re-education.    Manual Therapy (60238) as indicated to include: Passive Range of Motion, Gr I-IV mobilizations, Soft Tissue Mobilization, and Dry Needling/IASTM  Modalities as needed that may include: Cryotherapy and Electrical Stimulation  Patient education on joint protection, postural re-education, activity

## 2025-01-08 ENCOUNTER — OFFICE VISIT (OUTPATIENT)
Dept: ORTHOPEDIC SURGERY | Age: 25
End: 2025-01-08
Payer: COMMERCIAL

## 2025-01-08 VITALS — WEIGHT: 158 LBS | HEIGHT: 74 IN | RESPIRATION RATE: 16 BRPM | BODY MASS INDEX: 20.28 KG/M2

## 2025-01-08 DIAGNOSIS — S52.042A CLOSED DISPLACED FRACTURE OF CORONOID PROCESS OF LEFT ULNA, INITIAL ENCOUNTER: Primary | ICD-10-CM

## 2025-01-08 PROCEDURE — 99213 OFFICE O/P EST LOW 20 MIN: CPT | Performed by: ORTHOPAEDIC SURGERY

## 2025-01-08 PROCEDURE — G8427 DOCREV CUR MEDS BY ELIG CLIN: HCPCS | Performed by: ORTHOPAEDIC SURGERY

## 2025-01-08 PROCEDURE — M1308 PR FLU IMMUNIZE NO ADMIN: HCPCS | Performed by: ORTHOPAEDIC SURGERY

## 2025-01-08 PROCEDURE — G8420 CALC BMI NORM PARAMETERS: HCPCS | Performed by: ORTHOPAEDIC SURGERY

## 2025-01-08 PROCEDURE — 1036F TOBACCO NON-USER: CPT | Performed by: ORTHOPAEDIC SURGERY

## 2025-01-08 NOTE — PROGRESS NOTES
CHIEF COMPLAINT: Left elbow pain/ minimally displaced ulna cornoid fracture, possible elbow dislocation.    DATE OF INJURY: 2024    HISTORY:  Mr. Edmonds is a 24 y.o.  male right handed who presents today for follow up evaluation of a left elbow injury. The patient reports that this injury occurred when he fell skateboarding.  He was first seen and evaluated in [facility], when he was x-rayed and splinted, and asked to f/u with Orthopedics. Pain lateral left elbow is sharp, rated 1/10 and radiate to forearm. Movement makes the pain worse, and resting makes the pain better. No numbness or tingling sensation.  The patient denies any other injuries. He is in PT.     Past Medical History:   Diagnosis Date    ADHD (attention deficit hyperactivity disorder) 7750-3749    Stickler syndrome        Past Surgical History:   Procedure Laterality Date    CLEFT PALATE REPAIR      + hemangioma removal    COSMETIC SURGERY      DENTAL SURGERY      EYE SURGERY      MANDIBLE SURGERY      MYRINGOTOMY AND TYMPANOSTOMY TUBE PLACEMENT      OTHER SURGICAL HISTORY      Left eyebrow laceration repair    RETINAL DETACHMENT SURGERY Left        Social History     Socioeconomic History    Marital status: Single     Spouse name: Not on file    Number of children: Not on file    Years of education: Not on file    Highest education level: Not on file   Occupational History    Not on file   Tobacco Use    Smoking status: Former     Current packs/day: 0.00     Average packs/day: 0.5 packs/day for 6.8 years (3.4 ttl pk-yrs)     Types: Cigarettes, E-Cigarettes     Start date: 2017     Quit date: 2023     Years since quittin.1    Smokeless tobacco: Never   Substance and Sexual Activity    Alcohol use: Yes     Alcohol/week: 16.0 standard drinks of alcohol     Types: 16 Cans of beer per week    Drug use: Not Currently    Sexual activity: Yes     Partners: Female   Other Topics Concern    Not on file   Social History 
urgent medical care if acute or unstable issues arise. Results of some tests may be viewed thru MyChart but this does not substitute for follow up with MD. If follow up is not scheduled pt is instructed to call for follow up.  Any non cardiac issues identified in this visit the patient is counseled to address these with their primary care physician or appropriate specialist.    CARMEN BYRNE MD

## 2025-01-10 ENCOUNTER — HOSPITAL ENCOUNTER (OUTPATIENT)
Dept: PHYSICAL THERAPY | Age: 25
Setting detail: THERAPIES SERIES
Discharge: HOME | End: 2025-01-10
Payer: COMMERCIAL

## 2025-01-10 PROCEDURE — 97110 THERAPEUTIC EXERCISES: CPT

## 2025-01-10 PROCEDURE — 97140 MANUAL THERAPY 1/> REGIONS: CPT

## 2025-01-10 PROCEDURE — 97530 THERAPEUTIC ACTIVITIES: CPT

## 2025-01-10 NOTE — FLOWSHEET NOTE
Therapeutic procedure, 1 or more areas, each 15 minutes; neuromuscular reeducation of movement, balance, coordination, kinesthetic sense, posture, and/or proprioception for sitting and/or standing activities  (95719) HOME EXERCISE PROGRAM - Reviewed/Progressed HEP activities related to neuromuscular reeducation of movement, balance, coordination, kinesthetic sense, posture, and/or proprioception for sitting and/or standing activities    (84570) THERAPEUTIC ACTIVITY - use of dynamic activities to improve functional performance. (Ex include squatting, ascending/descending stairs, walking, bending, lifting, catching, throwing, pushing, pulling, jumping.)  Direct, one on one contact, billed in 15-minute increments.  (62366) MANUAL THERAPY -  Manual therapy techniques, 1 or more regions, each 15 minutes (Mobilization/manipulation, manual lymphatic drainage, manual traction) for the purpose of modulating pain, promoting relaxation,  increasing ROM, reducing/eliminating soft tissue swelling/inflammation/restriction, improving soft tissue extensibility and allowing for proper ROM for normal function with self care, mobility, lifting and ambulation    GOALS     Patient stated goal: return to normal ADLs and recreational activities   [] Progressing: [] Met: [] Not Met: [] Adjusted    Therapist goals for Patient:   Short Term Goals: To be achieved in: 2 weeks  1. Independent in HEP and progression per patient tolerance, in order to prevent re-injury.   [] Progressing: [] Met: [] Not Met: [] Adjusted  2. Patient will have a decrease in pain to <0/10 to facilitate improvement in movement, function, and ADLs as indicated by Functional Deficits.  [] Progressing: [] Met: [] Not Met: [] Adjusted    Long Term Goals: To be achieved in: 6-8  weeks  1. Disability index score of 15% or less for the Upper Extremity functional Scale to assist with reaching prior level of function with activities such as ADLs, recreational activities .  []

## 2025-01-21 ENCOUNTER — HOSPITAL ENCOUNTER (OUTPATIENT)
Dept: PHYSICAL THERAPY | Age: 25
Setting detail: THERAPIES SERIES
Discharge: HOME | End: 2025-01-21
Payer: COMMERCIAL

## 2025-01-21 PROCEDURE — 97110 THERAPEUTIC EXERCISES: CPT

## 2025-01-21 PROCEDURE — 97530 THERAPEUTIC ACTIVITIES: CPT

## 2025-01-21 NOTE — FLOWSHEET NOTE
[facility]- Outpatient Rehabilitation and Therapy [address] [place] [address] office: [phone] fax: [phone]         Physical Therapy: TREATMENT/PROGRESS NOTE   Patient: Drew Edmonds (24 y.o. male)   Examination Date: 2025   :  2000 MRN: [id number]      Insurance: Payor: [employer] / Plan: [employer] HE[employer] - CHOICE PLUS / Product Type: *No Product type* /   Insurance ID: [id number] - (Commercial)  Secondary Insurance (if applicable):    Treatment Diagnosis:     ICD-10-CM    1. Left elbow pain  M25.522       2. Elbow stiffness, left  M25.622          Medical Diagnosis:  Closed displaced fracture of coronoid process of left ulna, initial encounter [S52.042A]   Referring Physician: Deepthi Moody MD  PCP: Nazia Toney DO     Plan of care signed (Y/N):     Date of Patient follow up with Physician:      Progress Report/POC: EVAL today  POC update due: (10 visits /OR AUTH LIMITS, whichever is less)  1/10/2025      Medical History:  Comorbidities:  None  Relevant Medical History:                                            Precautions/ Contra-indications:           Latex allergy:  NO  Pacemaker:    NO  Contraindications for Manipulation: None  Date of Surgery:   Other:    Red Flags:  None    Suicide Screening:   The patient did not verbalize a primary behavioral concern, suicidal ideation, suicidal intent, or demonstrate suicidal behaviors.    Preferred Language for Healthcare:   [x] English       [] other:      Falls Risk Assessment (30 days):   Falls Risk assessed and no intervention required.  Time Up and Go (TUG):   Not Assessed     Visit #:   Insurance Allowable Auth Needed   7 [place] []Yes    []No     SUBJECTIVE EXAMINATION     Patient stated complaint: pt notes he did fine with travel last week but is a bit more sore first thing this morning. It is feeling a bit better as his day continues.         Functional questionnaire: Upper Extremity functional

## 2025-01-24 ENCOUNTER — APPOINTMENT (OUTPATIENT)
Dept: PHYSICAL THERAPY | Age: 25
End: 2025-01-24
Payer: COMMERCIAL

## 2025-01-29 ENCOUNTER — HOSPITAL ENCOUNTER (OUTPATIENT)
Dept: PHYSICAL THERAPY | Age: 25
Setting detail: THERAPIES SERIES
Discharge: HOME | End: 2025-01-29
Payer: COMMERCIAL

## 2025-01-29 PROCEDURE — 97110 THERAPEUTIC EXERCISES: CPT

## 2025-01-29 PROCEDURE — 97530 THERAPEUTIC ACTIVITIES: CPT

## 2025-01-29 NOTE — FLOWSHEET NOTE
[facility]- Outpatient Rehabilitation and Therapy [address] [place] [id number] office: [phone] fax: [phone]         Physical Therapy: TREATMENT/PROGRESS NOTE   Patient: Drew Edmonds (24 y.o. male)   Examination Date: 2025   :  2000 MRN: [id number]      Insurance: Payor: [employer] / Plan: [employer] - CHOICE PLUS / Product Type: *No Product type* /   Insurance ID: [id number] - (Commercial)  Secondary Insurance (if applicable):    Treatment Diagnosis:     ICD-10-CM    1. Left elbow pain  M25.522       2. Elbow stiffness, left  M25.622          Medical Diagnosis:  Closed displaced fracture of coronoid process of left ulna, initial encounter [S52.042A]   Referring Physician: Deepthi Moody MD  PCP: Nazia Toney DO     Plan of care signed (Y/N):     Date of Patient follow up with Physician:      Progress Report/POC: EVAL today  POC update due: (10 visits /OR AUTH LIMITS, whichever is less)  1/10/2025      Medical History:  Comorbidities:  None  Relevant Medical History:                                            Precautions/ Contra-indications:           Latex allergy:  NO  Pacemaker:    NO  Contraindications for Manipulation: None  Date of Surgery:   Other:    Red Flags:  None    Suicide Screening:   The patient did not verbalize a primary behavioral concern, suicidal ideation, suicidal intent, or demonstrate suicidal behaviors.    Preferred Language for Healthcare:   [x] English       [] other:      Falls Risk Assessment (30 days):   Falls Risk assessed and no intervention required.  Time Up and Go (TUG):   Not Assessed     Visit #:   Insurance Allowable Auth Needed   8 MN []Yes    []No     SUBJECTIVE EXAMINATION     Patient stated complaint:  pt notes he is doing well, he went skiing last weekend and notes that he did well but does still increased soreness throughout wrist extensor mass to lateral epicondyle.         Functional questionnaire:

## 2025-02-05 ENCOUNTER — HOSPITAL ENCOUNTER (OUTPATIENT)
Dept: PHYSICAL THERAPY | Age: 25
Setting detail: THERAPIES SERIES
Discharge: HOME | End: 2025-02-05
Payer: COMMERCIAL

## 2025-02-05 ENCOUNTER — OFFICE VISIT (OUTPATIENT)
Dept: ORTHOPEDIC SURGERY | Age: 25
End: 2025-02-05
Payer: COMMERCIAL

## 2025-02-05 VITALS — RESPIRATION RATE: 16 BRPM | WEIGHT: 158 LBS | HEIGHT: 74 IN | BODY MASS INDEX: 20.28 KG/M2

## 2025-02-05 DIAGNOSIS — S52.042A CLOSED DISPLACED FRACTURE OF CORONOID PROCESS OF LEFT ULNA, INITIAL ENCOUNTER: Primary | ICD-10-CM

## 2025-02-05 PROCEDURE — 97110 THERAPEUTIC EXERCISES: CPT

## 2025-02-05 PROCEDURE — 1036F TOBACCO NON-USER: CPT | Performed by: ORTHOPAEDIC SURGERY

## 2025-02-05 PROCEDURE — G8427 DOCREV CUR MEDS BY ELIG CLIN: HCPCS | Performed by: ORTHOPAEDIC SURGERY

## 2025-02-05 PROCEDURE — 99213 OFFICE O/P EST LOW 20 MIN: CPT | Performed by: ORTHOPAEDIC SURGERY

## 2025-02-05 PROCEDURE — G8420 CALC BMI NORM PARAMETERS: HCPCS | Performed by: ORTHOPAEDIC SURGERY

## 2025-02-05 PROCEDURE — 97530 THERAPEUTIC ACTIVITIES: CPT

## 2025-02-05 NOTE — PROGRESS NOTES
CHIEF COMPLAINT: Left elbow pain/ minimally displaced ulna cornoid fracture, possible elbow dislocation.    DATE OF INJURY: 2024    HISTORY:  Mr. Edmonds is a 24 y.o.  male right handed who presents today for follow up evaluation of a left elbow injury. The patient reports that this injury occurred when he fell skateboarding.  He was first seen and evaluated in [facility], when he was x-rayed and splinted, and asked to f/u with Orthopedics. Pain lateral left elbow is sharp, rated 2/10 and radiate to forearm. Movement makes the pain worse, and resting makes the pain better. No numbness or tingling sensation.  The patient denies any other injuries. He is in PT.     Past Medical History:   Diagnosis Date    ADHD (attention deficit hyperactivity disorder) 2484-9956    Stickler syndrome        Past Surgical History:   Procedure Laterality Date    CLEFT PALATE REPAIR      + hemangioma removal    COSMETIC SURGERY      DENTAL SURGERY      EYE SURGERY      MANDIBLE SURGERY      MYRINGOTOMY AND TYMPANOSTOMY TUBE PLACEMENT      OTHER SURGICAL HISTORY      Left eyebrow laceration repair    RETINAL DETACHMENT SURGERY Left        Social History     Socioeconomic History    Marital status: Single     Spouse name: Not on file    Number of children: Not on file    Years of education: Not on file    Highest education level: Not on file   Occupational History    Not on file   Tobacco Use    Smoking status: Former     Current packs/day: 0.00     Average packs/day: 0.5 packs/day for 6.8 years (3.4 ttl pk-yrs)     Types: Cigarettes, E-Cigarettes     Start date: 2017     Quit date: 2023     Years since quittin.2    Smokeless tobacco: Never   Substance and Sexual Activity    Alcohol use: Yes     Alcohol/week: 16.0 standard drinks of alcohol     Types: 16 Cans of beer per week    Drug use: Not Currently    Sexual activity: Yes     Partners: Female   Other Topics Concern    Not on file   Social History

## 2025-02-05 NOTE — PLAN OF CARE
Pt called to state that amlodipine should be sent to [facility], not [facility]. Prescription resent to correct pharmacy.   
Exercise (09342) including: strength training, ROM, and functional mobility  Therapeutic Activities (90835) including: functional mobility training and education.  Neuromuscular Re-education (35311) activation and proprioception, including postural re-education.    Manual Therapy (63910) as indicated to include: Passive Range of Motion, Gr I-IV mobilizations, Soft Tissue Mobilization, and Dry Needling/IASTM  Modalities as needed that may include: Cryotherapy and Electrical Stimulation  Patient education on joint protection, postural re-education, activity modification, and progression of HEP    Plan: POC initiated as per evaluation    Electronically Signed by SNEHA Zheng PT, DPT, Board-Certified Specialist in Orthopaedics      Date: 02/05/2025     Note: Portions of this note have been templated and/or copied from initial evaluation, reassessments and prior notes for documentation efficiency.    Note: If patient does not return for scheduled/recommended follow up visits, this note will serve as a discharge from care along with the most recent update on progress.

## 2025-04-08 ENCOUNTER — PATIENT MESSAGE (OUTPATIENT)
Dept: PRIMARY CARE CLINIC | Age: 25
End: 2025-04-08

## 2025-04-08 DIAGNOSIS — R53.83 OTHER FATIGUE: Primary | ICD-10-CM

## 2025-04-11 DIAGNOSIS — R53.83 OTHER FATIGUE: ICD-10-CM

## 2025-04-15 ENCOUNTER — RESULTS FOLLOW-UP (OUTPATIENT)
Dept: PRIMARY CARE CLINIC | Age: 25
End: 2025-04-15

## 2025-04-15 LAB
SHBG SERPL-SCNC: 32 NMOL/L (ref 10–60)
TESTOST FREE SERPL-MCNC: 126.8 PG/ML (ref 47–244)
TESTOST SERPL-MCNC: 579 NG/DL (ref 249–836)

## 2025-04-15 NOTE — RESULT ENCOUNTER NOTE
Your recent test results are all normal. Please don't hesitate to contact the office with any additional questions/concerns - Dr. Toney

## 2025-06-04 ENCOUNTER — OFFICE VISIT (OUTPATIENT)
Dept: PRIMARY CARE CLINIC | Age: 25
End: 2025-06-04
Payer: COMMERCIAL

## 2025-06-04 ENCOUNTER — TELEPHONE (OUTPATIENT)
Dept: PRIMARY CARE CLINIC | Age: 25
End: 2025-06-04

## 2025-06-04 VITALS
HEART RATE: 69 BPM | OXYGEN SATURATION: 99 % | WEIGHT: 162 LBS | SYSTOLIC BLOOD PRESSURE: 109 MMHG | BODY MASS INDEX: 20.79 KG/M2 | TEMPERATURE: 98.1 F | HEIGHT: 74 IN | DIASTOLIC BLOOD PRESSURE: 71 MMHG

## 2025-06-04 DIAGNOSIS — S40.021A ARM BRUISE, RIGHT, INITIAL ENCOUNTER: Primary | ICD-10-CM

## 2025-06-04 DIAGNOSIS — D68.51 HETEROZYGOUS FACTOR V LEIDEN MUTATION: ICD-10-CM

## 2025-06-04 PROCEDURE — G8420 CALC BMI NORM PARAMETERS: HCPCS | Performed by: NURSE PRACTITIONER

## 2025-06-04 PROCEDURE — 1036F TOBACCO NON-USER: CPT | Performed by: NURSE PRACTITIONER

## 2025-06-04 PROCEDURE — 99214 OFFICE O/P EST MOD 30 MIN: CPT | Performed by: NURSE PRACTITIONER

## 2025-06-04 PROCEDURE — G8427 DOCREV CUR MEDS BY ELIG CLIN: HCPCS | Performed by: NURSE PRACTITIONER

## 2025-06-04 SDOH — ECONOMIC STABILITY: FOOD INSECURITY: WITHIN THE PAST 12 MONTHS, THE FOOD YOU BOUGHT JUST DIDN'T LAST AND YOU DIDN'T HAVE MONEY TO GET MORE.: NEVER TRUE

## 2025-06-04 SDOH — ECONOMIC STABILITY: FOOD INSECURITY: WITHIN THE PAST 12 MONTHS, YOU WORRIED THAT YOUR FOOD WOULD RUN OUT BEFORE YOU GOT MONEY TO BUY MORE.: NEVER TRUE

## 2025-06-04 ASSESSMENT — PATIENT HEALTH QUESTIONNAIRE - PHQ9
SUM OF ALL RESPONSES TO PHQ QUESTIONS 1-9: 0
1. LITTLE INTEREST OR PLEASURE IN DOING THINGS: NOT AT ALL
2. FEELING DOWN, DEPRESSED OR HOPELESS: NOT AT ALL
SUM OF ALL RESPONSES TO PHQ QUESTIONS 1-9: 0

## 2025-06-04 NOTE — PROGRESS NOTES
[facility]  [facility]  [address] [place] [address]  Dept: [phone]  Dept Fax: [phone]     2025      Drew Edmonds   2000     Chief Complaint   Patient presents with    Other     Bruise on inner bicep broken elbow in November not painful       HPI     Patient presents to have bruise evaluated on his LUE.  He states he broke and dislocated his ulna in November and thinks he had initial bruising under his arm but due to the location he did not really notice it until someone pointed it out so he is unsure exactly how long it has been there. He states he still has some bruising that is faint but has not resolved or changed much in the past few months.  He and his father both have factor V Leiden and his father had a bruise for several months in his hamstring after straining it turned out to be massive blood clot which later caused a PE. He is concerned that since he also has factor V Leiden that he should get testing to rule out a blood clot since the bruise has been there for months. He denies pain, numbness, weakness in upper extremity.           2025     8:24 AM 4/10/2024     9:43 AM 10/24/2023     3:08 PM   PHQ Scores   PHQ2 Score 0 2 0   PHQ9 Score 0 2 0     Interpretation of Total Score Depression Severity: 1-4 = Minimal depression, 5-9 = Mild depression, 10-14 = Moderate depression, 15-19 = Moderately severe depression, 20-27 = Severe depression     Prior to Visit Medications    Not on File       Past Medical History:   Diagnosis Date    ADHD (attention deficit hyperactivity disorder) 3435-7519    Stickler syndrome         Social History     Tobacco Use    Smoking status: Former     Current packs/day: 0.00     Average packs/day: 0.5 packs/day for 6.8 years (3.4 ttl pk-yrs)     Types: Cigarettes, E-Cigarettes     Start date: 2017     Quit date: 2023     Years since quittin.5    Smokeless tobacco: Never   Substance Use Topics

## 2025-06-04 NOTE — TELEPHONE ENCOUNTER
Central scheduling calls to inform [facility] that pts orders need to be updated, it needs to state hospital performers not clinic performed, pt states it is his left arm and not right arm.

## 2025-06-06 ENCOUNTER — RESULTS FOLLOW-UP (OUTPATIENT)
Dept: PRIMARY CARE CLINIC | Age: 25
End: 2025-06-06

## 2025-06-06 ENCOUNTER — HOSPITAL ENCOUNTER (OUTPATIENT)
Dept: VASCULAR LAB | Age: 25
Discharge: HOME | End: 2025-06-08
Payer: COMMERCIAL

## 2025-06-06 DIAGNOSIS — S40.021A ARM BRUISE, RIGHT, INITIAL ENCOUNTER: ICD-10-CM

## 2025-06-06 PROCEDURE — 93971 EXTREMITY STUDY: CPT

## 2025-06-06 PROCEDURE — 93971 EXTREMITY STUDY: CPT | Performed by: INTERNAL MEDICINE

## 2025-06-20 ENCOUNTER — OFFICE VISIT (OUTPATIENT)
Dept: PRIMARY CARE CLINIC | Age: 25
End: 2025-06-20
Payer: COMMERCIAL

## 2025-06-20 VITALS
DIASTOLIC BLOOD PRESSURE: 63 MMHG | HEIGHT: 74 IN | WEIGHT: 159.6 LBS | SYSTOLIC BLOOD PRESSURE: 104 MMHG | OXYGEN SATURATION: 96 % | HEART RATE: 76 BPM | TEMPERATURE: 98.3 F | BODY MASS INDEX: 20.48 KG/M2

## 2025-06-20 DIAGNOSIS — R21 RASH: Primary | ICD-10-CM

## 2025-06-20 PROCEDURE — 99214 OFFICE O/P EST MOD 30 MIN: CPT | Performed by: NURSE PRACTITIONER

## 2025-06-20 PROCEDURE — G8420 CALC BMI NORM PARAMETERS: HCPCS | Performed by: NURSE PRACTITIONER

## 2025-06-20 PROCEDURE — 1036F TOBACCO NON-USER: CPT | Performed by: NURSE PRACTITIONER

## 2025-06-20 PROCEDURE — G8427 DOCREV CUR MEDS BY ELIG CLIN: HCPCS | Performed by: NURSE PRACTITIONER

## 2025-06-20 RX ORDER — TRIAMCINOLONE ACETONIDE 1 MG/G
CREAM TOPICAL
Qty: 80 G | Refills: 0 | Status: SHIPPED | OUTPATIENT
Start: 2025-06-20

## 2025-06-20 RX ORDER — PERMETHRIN 50 MG/G
CREAM TOPICAL
Qty: 60 G | Refills: 0 | Status: SHIPPED | OUTPATIENT
Start: 2025-06-20

## 2025-06-20 RX ORDER — DOXYCYCLINE 100 MG/1
100 CAPSULE ORAL EVERY 12 HOURS
COMMUNITY
Start: 2025-06-17 | End: 2025-06-27

## 2025-06-20 NOTE — PROGRESS NOTES
[facility]  [facility]  [address] [place] [id number]  Dept: [phone]  Dept Fax: [phone]     6/20/2025      Drew Edmonds   2000     Chief Complaint   Patient presents with    Other     Skin concerns , rash on thigh folliculitis antibiotics for 10 days itchy and  no spreading rash has gotten better        HPI     Patient presents for evaluation of rash.  He states he did a [employer] Race recently and a couple days later developed a rash on his bilateral buttocks.  He attributes this to sliding down a section of the race on his buttocks in the mud.  He did end up going to urgent care on Tuesday and was diagnosed with folliculitis and prescribed doxycycline.  He states the rash on his buttocks is getting better but he has noticed new bumps popping up in various areas on his body that are very itchy.  He is concerned he could possibly have scabies because the nurse practitioner at the urgent care did mention this as a possible cause of his rash initially.  He has used various over-the-counter creams and ointments without improvement of symptoms.        6/4/2025     8:24 AM 4/10/2024     9:43 AM 10/24/2023     3:08 PM   PHQ Scores   PHQ2 Score 0 2 0   PHQ9 Score 0 2 0     Interpretation of Total Score Depression Severity: 1-4 = Minimal depression, 5-9 = Mild depression, 10-14 = Moderate depression, 15-19 = Moderately severe depression, 20-27 = Severe depression     Prior to Visit Medications    Medication Sig Taking? Authorizing Provider   doxycycline hyclate (VIBRAMYCIN) 100 MG capsule Take 1 capsule by mouth in the morning and 1 capsule in the evening. Yes ProviderLorna MD       Past Medical History:   Diagnosis Date    ADHD (attention deficit hyperactivity disorder) 5512-5561    Stickler syndrome         Social History     Tobacco Use    Smoking status: Former     Current packs/day: 0.00     Average packs/day: 0.5 packs/day for 6.8 years (3.4 ttl

## 2025-06-30 ENCOUNTER — OFFICE VISIT (OUTPATIENT)
Dept: PRIMARY CARE CLINIC | Age: 25
End: 2025-06-30
Payer: COMMERCIAL

## 2025-06-30 VITALS
OXYGEN SATURATION: 97 % | HEART RATE: 56 BPM | TEMPERATURE: 97.8 F | DIASTOLIC BLOOD PRESSURE: 71 MMHG | BODY MASS INDEX: 20.41 KG/M2 | WEIGHT: 159 LBS | SYSTOLIC BLOOD PRESSURE: 113 MMHG

## 2025-06-30 DIAGNOSIS — B37.2 SKIN CANDIDIASIS: ICD-10-CM

## 2025-06-30 DIAGNOSIS — R21 RASH AND OTHER NONSPECIFIC SKIN ERUPTION: Primary | ICD-10-CM

## 2025-06-30 PROCEDURE — 99213 OFFICE O/P EST LOW 20 MIN: CPT | Performed by: FAMILY MEDICINE

## 2025-06-30 PROCEDURE — G8428 CUR MEDS NOT DOCUMENT: HCPCS | Performed by: FAMILY MEDICINE

## 2025-06-30 PROCEDURE — G8420 CALC BMI NORM PARAMETERS: HCPCS | Performed by: FAMILY MEDICINE

## 2025-06-30 PROCEDURE — 1036F TOBACCO NON-USER: CPT | Performed by: FAMILY MEDICINE

## 2025-06-30 RX ORDER — TRIAMCINOLONE ACETONIDE 1 MG/G
30 OINTMENT TOPICAL 2 TIMES DAILY
Qty: 30 G | Refills: 0 | Status: SHIPPED | OUTPATIENT
Start: 2025-06-30

## 2025-06-30 RX ORDER — NYSTATIN 100000 U/G
CREAM TOPICAL
Qty: 30 G | Refills: 0 | Status: SHIPPED | OUTPATIENT
Start: 2025-06-30

## 2025-06-30 NOTE — PROGRESS NOTES
[facility]  [facility]  [address] [place] [id number]  Dept: [phone]  Dept Fax: [phone]     2025      Drew Edmonds   2000     Chief Complaint   Patient presents with    Rash       HPI    Pt comes in today for follow up rash.     Started with itchy/painful rash area bilateral buttocks after sliding down a muddy hill in a [employer] Race on . Went to Urgent Care on 25 and dx with folliculitis. Rx doxycycline. On day 3 came to see Akiko Eddy NP and was advised to continue antibiotics. Feels the rash on his thighs had gotten worse so only took antibiotic for 1 more day and then stopped. Now has an itchy rash on both elbows and a small area on right wrist. Has not had any culture or testing. Has been using topical triamcinolone to affected areas and has helped temporarily. Used the permethrin as well due to urgent care suggesting possible scabies. Has been using OTC allergy meds. Upper thigh area is significantly improved. But the other areas are still inflamed. No fever or ill symptoms. No contacts with anyone with similar rash and no change in home products.     No data recorded     Prior to Visit Medications    Medication Sig Taking? Authorizing Provider   permethrin (ELIMITE) 5 % cream Apply topically as directed  Akiko Eddy, DIANE - CNP   triamcinolone (KENALOG) 0.1 % cream Apply topically 2 times daily.  Akiko Eddy, DIANE - CNP        Past Medical History:   Diagnosis Date    ADHD (attention deficit hyperactivity disorder) 4247-6741    Stickler syndrome         Social History     Tobacco Use    Smoking status: Former     Current packs/day: 0.00     Average packs/day: 0.5 packs/day for 6.8 years (3.4 ttl pk-yrs)     Types: Cigarettes, E-Cigarettes     Start date: 2017     Quit date: 2023     Years since quittin.6    Smokeless tobacco: Never   Substance Use Topics    Alcohol use: Yes     Alcohol/week: